# Patient Record
Sex: MALE | Race: WHITE | NOT HISPANIC OR LATINO | Employment: FULL TIME | URBAN - METROPOLITAN AREA
[De-identification: names, ages, dates, MRNs, and addresses within clinical notes are randomized per-mention and may not be internally consistent; named-entity substitution may affect disease eponyms.]

---

## 2020-07-18 PROCEDURE — U0003 INFECTIOUS AGENT DETECTION BY NUCLEIC ACID (DNA OR RNA); SEVERE ACUTE RESPIRATORY SYNDROME CORONAVIRUS 2 (SARS-COV-2) (CORONAVIRUS DISEASE [COVID-19]), AMPLIFIED PROBE TECHNIQUE, MAKING USE OF HIGH THROUGHPUT TECHNOLOGIES AS DESCRIBED BY CMS-2020-01-R: HCPCS | Performed by: FAMILY MEDICINE

## 2020-07-21 ENCOUNTER — TELEPHONE (OUTPATIENT)
Dept: URGENT CARE | Facility: CLINIC | Age: 40
End: 2020-07-21

## 2021-07-11 PROCEDURE — U0004 COV-19 TEST NON-CDC HGH THRU: HCPCS | Performed by: PERSONAL EMERGENCY RESPONSE ATTENDANT

## 2021-10-26 ENCOUNTER — LAB (OUTPATIENT)
Dept: LAB | Facility: HOSPITAL | Age: 41
End: 2021-10-26

## 2021-10-26 ENCOUNTER — OFFICE VISIT (OUTPATIENT)
Dept: INTERNAL MEDICINE | Facility: CLINIC | Age: 41
End: 2021-10-26

## 2021-10-26 ENCOUNTER — TELEPHONE (OUTPATIENT)
Dept: INTERNAL MEDICINE | Facility: CLINIC | Age: 41
End: 2021-10-26

## 2021-10-26 VITALS
HEIGHT: 68 IN | SYSTOLIC BLOOD PRESSURE: 118 MMHG | TEMPERATURE: 96.8 F | WEIGHT: 235.6 LBS | DIASTOLIC BLOOD PRESSURE: 80 MMHG | HEART RATE: 80 BPM | BODY MASS INDEX: 35.71 KG/M2 | RESPIRATION RATE: 20 BRPM | OXYGEN SATURATION: 97 %

## 2021-10-26 DIAGNOSIS — Z00.00 ROUTINE ADULT HEALTH MAINTENANCE: ICD-10-CM

## 2021-10-26 DIAGNOSIS — F41.0 PANIC ATTACKS: ICD-10-CM

## 2021-10-26 DIAGNOSIS — Z00.00 ROUTINE ADULT HEALTH MAINTENANCE: Primary | ICD-10-CM

## 2021-10-26 DIAGNOSIS — Z80.0 FAMILY HISTORY OF COLON CANCER: ICD-10-CM

## 2021-10-26 DIAGNOSIS — E66.09 CLASS 2 OBESITY DUE TO EXCESS CALORIES WITHOUT SERIOUS COMORBIDITY WITH BODY MASS INDEX (BMI) OF 36.0 TO 36.9 IN ADULT: ICD-10-CM

## 2021-10-26 DIAGNOSIS — Z23 NEED FOR VACCINATION: ICD-10-CM

## 2021-10-26 PROBLEM — E66.812 CLASS 2 OBESITY DUE TO EXCESS CALORIES WITHOUT SERIOUS COMORBIDITY WITH BODY MASS INDEX (BMI) OF 36.0 TO 36.9 IN ADULT: Status: ACTIVE | Noted: 2021-10-26

## 2021-10-26 LAB
ALBUMIN SERPL-MCNC: 4.8 G/DL (ref 3.5–5.2)
ALBUMIN/GLOB SERPL: 1.9 G/DL
ALP SERPL-CCNC: 106 U/L (ref 39–117)
ALT SERPL W P-5'-P-CCNC: 17 U/L (ref 1–41)
ANION GAP SERPL CALCULATED.3IONS-SCNC: 10.7 MMOL/L (ref 5–15)
AST SERPL-CCNC: 16 U/L (ref 1–40)
BASOPHILS # BLD AUTO: 0.04 10*3/MM3 (ref 0–0.2)
BASOPHILS NFR BLD AUTO: 0.5 % (ref 0–1.5)
BILIRUB SERPL-MCNC: 0.6 MG/DL (ref 0–1.2)
BUN SERPL-MCNC: 11 MG/DL (ref 6–20)
BUN/CREAT SERPL: 11 (ref 7–25)
CALCIUM SPEC-SCNC: 9.6 MG/DL (ref 8.6–10.5)
CHLORIDE SERPL-SCNC: 103 MMOL/L (ref 98–107)
CHOLEST SERPL-MCNC: 167 MG/DL (ref 0–200)
CO2 SERPL-SCNC: 26.3 MMOL/L (ref 22–29)
CREAT SERPL-MCNC: 1 MG/DL (ref 0.76–1.27)
DEPRECATED RDW RBC AUTO: 41.6 FL (ref 37–54)
EOSINOPHIL # BLD AUTO: 0.15 10*3/MM3 (ref 0–0.4)
EOSINOPHIL NFR BLD AUTO: 1.9 % (ref 0.3–6.2)
ERYTHROCYTE [DISTWIDTH] IN BLOOD BY AUTOMATED COUNT: 12.7 % (ref 12.3–15.4)
GFR SERPL CREATININE-BSD FRML MDRD: 83 ML/MIN/1.73
GLOBULIN UR ELPH-MCNC: 2.5 GM/DL
GLUCOSE SERPL-MCNC: 89 MG/DL (ref 65–99)
HCT VFR BLD AUTO: 44.5 % (ref 37.5–51)
HCV AB SER DONR QL: NORMAL
HDLC SERPL-MCNC: 41 MG/DL (ref 40–60)
HGB BLD-MCNC: 15.2 G/DL (ref 13–17.7)
IMM GRANULOCYTES # BLD AUTO: 0.01 10*3/MM3 (ref 0–0.05)
IMM GRANULOCYTES NFR BLD AUTO: 0.1 % (ref 0–0.5)
LDLC SERPL CALC-MCNC: 114 MG/DL (ref 0–100)
LDLC/HDLC SERPL: 2.78 {RATIO}
LYMPHOCYTES # BLD AUTO: 2.47 10*3/MM3 (ref 0.7–3.1)
LYMPHOCYTES NFR BLD AUTO: 31.7 % (ref 19.6–45.3)
MCH RBC QN AUTO: 30.1 PG (ref 26.6–33)
MCHC RBC AUTO-ENTMCNC: 34.2 G/DL (ref 31.5–35.7)
MCV RBC AUTO: 88.1 FL (ref 79–97)
MONOCYTES # BLD AUTO: 0.52 10*3/MM3 (ref 0.1–0.9)
MONOCYTES NFR BLD AUTO: 6.7 % (ref 5–12)
NEUTROPHILS NFR BLD AUTO: 4.6 10*3/MM3 (ref 1.7–7)
NEUTROPHILS NFR BLD AUTO: 59.1 % (ref 42.7–76)
NRBC BLD AUTO-RTO: 0 /100 WBC (ref 0–0.2)
PLATELET # BLD AUTO: 311 10*3/MM3 (ref 140–450)
PMV BLD AUTO: 9.8 FL (ref 6–12)
POTASSIUM SERPL-SCNC: 3.9 MMOL/L (ref 3.5–5.2)
PROT SERPL-MCNC: 7.3 G/DL (ref 6–8.5)
RBC # BLD AUTO: 5.05 10*6/MM3 (ref 4.14–5.8)
SODIUM SERPL-SCNC: 140 MMOL/L (ref 136–145)
TRIGL SERPL-MCNC: 60 MG/DL (ref 0–150)
TSH SERPL DL<=0.05 MIU/L-ACNC: 1.05 UIU/ML (ref 0.27–4.2)
VLDLC SERPL-MCNC: 12 MG/DL (ref 5–40)
WBC # BLD AUTO: 7.79 10*3/MM3 (ref 3.4–10.8)

## 2021-10-26 PROCEDURE — 80053 COMPREHEN METABOLIC PANEL: CPT | Performed by: STUDENT IN AN ORGANIZED HEALTH CARE EDUCATION/TRAINING PROGRAM

## 2021-10-26 PROCEDURE — 80061 LIPID PANEL: CPT | Performed by: STUDENT IN AN ORGANIZED HEALTH CARE EDUCATION/TRAINING PROGRAM

## 2021-10-26 PROCEDURE — 90471 IMMUNIZATION ADMIN: CPT | Performed by: STUDENT IN AN ORGANIZED HEALTH CARE EDUCATION/TRAINING PROGRAM

## 2021-10-26 PROCEDURE — 84443 ASSAY THYROID STIM HORMONE: CPT | Performed by: STUDENT IN AN ORGANIZED HEALTH CARE EDUCATION/TRAINING PROGRAM

## 2021-10-26 PROCEDURE — 90686 IIV4 VACC NO PRSV 0.5 ML IM: CPT | Performed by: STUDENT IN AN ORGANIZED HEALTH CARE EDUCATION/TRAINING PROGRAM

## 2021-10-26 PROCEDURE — 86803 HEPATITIS C AB TEST: CPT | Performed by: STUDENT IN AN ORGANIZED HEALTH CARE EDUCATION/TRAINING PROGRAM

## 2021-10-26 PROCEDURE — 36415 COLL VENOUS BLD VENIPUNCTURE: CPT

## 2021-10-26 PROCEDURE — 85025 COMPLETE CBC W/AUTO DIFF WBC: CPT | Performed by: STUDENT IN AN ORGANIZED HEALTH CARE EDUCATION/TRAINING PROGRAM

## 2021-10-26 PROCEDURE — 99385 PREV VISIT NEW AGE 18-39: CPT | Performed by: STUDENT IN AN ORGANIZED HEALTH CARE EDUCATION/TRAINING PROGRAM

## 2021-10-26 PROCEDURE — 99214 OFFICE O/P EST MOD 30 MIN: CPT | Performed by: STUDENT IN AN ORGANIZED HEALTH CARE EDUCATION/TRAINING PROGRAM

## 2021-10-26 NOTE — PROGRESS NOTES
Sherif Dickens is here today for an annual physical exam.     Feeling well today.  His main concern today is to set up screening for colonoscopy.  He states that his brother was recently diagnosed with colon cancer.  His brother is currently 36 years old and was diagnosed within the last year.  He has no other immediate family history of colon cancer.  He notes that he has regular bowel movements daily.  Of note, he does mention an episode of bloody stool which occurred about a month ago.  He denies changes in weight, fatigue, night sweats.        PHQ-2 Depression Screening  Little interest or pleasure in doing things? 0   Feeling down, depressed, or hopeless? 0   PHQ-2 Total Score 0      Of note patient reports 2 episodes of panic attacks within the last year.  These occurred around bedtime.  Episodes lasted about 20 minutes.  He describes these attacks as an impending sense of doom, shortness of breath, chest pain.  He notes of stress related to work around that time.  He has not had panic attacks recently.  No changes in mood.  Does not report anxiety currently.      I have reviewed the patient's medical, family, and social history in detail and updated the computerized patient record.    Screening history:  Colonoscopy - ordered today due to family history   Prostate - n/a  Metabolic - labs ordered today     Health Maintenance   Topic Date Due   • ANNUAL PHYSICAL  Never done   • TDAP/TD VACCINES (2 - Td or Tdap) 10/29/2018   • HEPATITIS C SCREENING  Never done   • COVID-19 Vaccine  Completed   • INFLUENZA VACCINE  Completed   • Pneumococcal Vaccine 0-64  Aged Out       Review of Systems   Constitutional: Negative for activity change, appetite change, chills, fatigue, fever and unexpected weight change.   Respiratory: Negative.    Cardiovascular: Negative.    Gastrointestinal: Positive for blood in stool (one episode ).   Genitourinary: Negative.    Musculoskeletal: Negative.    Skin: Negative.    Neurological:  "Negative.    Psychiatric/Behavioral: Negative.        /80   Pulse 80   Temp 96.8 °F (36 °C) (Temporal)   Resp 20   Ht 171.7 cm (67.6\")   Wt 107 kg (235 lb 9.6 oz)   SpO2 97%   BMI 36.25 kg/m²      Physical Exam    Vital signs reviewed.  General appearance: No acute distress  Eyes: conjunctiva clear without erythema; pupils equally round and reactive  ENT: external ears and nose normal; hearing normal, oropharynx clear  Neck: supple; no thyromegaly  CV: normal rate and rhythm; no peripheral edema  Respiratory: normal respiratory effort; lungs clear to auscultation bilaterally  MSK: normal gait and station; no focal joint deformity or swelling  Skin: no rash or wounds; normal turgor  Neuro: cranial nerves 2-12 grossly intact; normal sensation to light touch  Psych: mood and affect normal; recent and remote memory intact    No visits with results within 2 Week(s) from this visit.   Latest known visit with results is:   Admission on 07/11/2021, Discharged on 07/11/2021   Component Date Value Ref Range Status   • SARS-CoV-2 IVONNE 07/11/2021 Not Detected  Not Detected Final   • Rapid Influenza A Ag 07/11/2021 Negative  Negative Final   • Rapid Influenza B Ag 07/11/2021 Negative  Negative Final   • Internal Control 07/11/2021 Passed  Passed Final   • Lot Number 07/11/2021 [id number]   Final   • Expiration Date 07/11/2021 [id number]   Final          Current Outpatient Medications:   •  multivitamin with minerals (MULTIVITAMIN ADULT PO), multivitamin, Disp: , Rfl:   •  predniSONE (DELTASONE) 10 MG (21) dose pack, Daily taper- 6/5/4/3/2/1, Disp: 21 tablet, Rfl: 0    Diagnoses and all orders for this visit:    1. Routine adult health maintenance (Primary)  -     CBC Auto Differential; Future  -     Comprehensive Metabolic Panel; Future  -     TSH Rfx On Abnormal To Free T4; Future  -     Lipid Panel; Future  -     Hepatitis C Antibody; Future    2. Need for vaccination  -     FluLaval/Fluarix/Fluzone >6 Months " ([id number])    3. Family history of colon cancer  Comments:  Brother recently diagnosed with colon cancer at age 36  Orders:  -     Ambulatory Referral For Screening Colonoscopy    4. Class 2 obesity due to excess calories without serious comorbidity with body mass index (BMI) of 36.0 to 36.9 in adult  Assessment & Plan:  Diet/exercise changes would be the best way to treat this at this time. Work on weight loss through a healthy diet with increased fiber from fruits and vegetables and decreased carbohydrates (sweets/sugars, pop, excess pasta/bread).  Should increase water intake and exercise a few times a week.  Discussed with patient that if these methods are unsuccessful a multidisciplinary team may be beneficial.  Discussed with patient long-term sequela associated with with obesity including but not limited to hypertension diabetes hyperlipidemia cardiovascular disease and stroke.        5. Panic attacks  Comments:  2 discrete episodes within the last year in the context of stressors from work.  Coping mechanisms discussed.  Continue to monitor    Counseling was given to patient for the following topics:  appropriate exercise, healthy eating habits, disease prevention, risk factors for cancer, importance of self testicular exam, importance of immunizations, including risks and benefits, bone health, sun safety, seatbelt use, safe driving and safe sex. Also discussed the importance of regular dental and vision care, as well recommendation for a yearly screening skin exam after age 40.  Written information provided to patient on these topics and other health maintenance issues.    Follow up yearly for physical    Adrian Branch MD  10/26/21

## 2021-10-26 NOTE — ASSESSMENT & PLAN NOTE
Diet/exercise changes would be the best way to treat this at this time. Work on weight loss through a healthy diet with increased fiber from fruits and vegetables and decreased carbohydrates (sweets/sugars, pop, excess pasta/bread).  Should increase water intake and exercise a few times a week.  Discussed with patient that if these methods are unsuccessful a multidisciplinary team may be beneficial.  Discussed with patient long-term sequela associated with with obesity including but not limited to hypertension diabetes hyperlipidemia cardiovascular disease and stroke.

## 2021-10-26 NOTE — TELEPHONE ENCOUNTER
"Called pt and informed to check mychart and lft cb number     Adrian Branch MD  [facility]  [address] [facility]  I have reviewed your blood results and overall, everything looks great. Your LDL or \"bad\" cholesterol is slightly elevated. I recommend eating a diet low in red meat, foods high in fat as well as starting regular physical activity.     "

## 2021-10-26 NOTE — TELEPHONE ENCOUNTER
"----- Message from Adrian Branch MD sent at 10/26/2021  3:31 PM EDT -----  I have reviewed your blood results and overall, everything looks great. Your LDL or \"bad\" cholesterol is slightly elevated. I recommend eating a diet low in red meat, foods high in fat as well as starting regular physical activity.   "

## 2021-12-22 RX ORDER — SOD SULF/POT CHLORIDE/MAG SULF 1.479 G
12 TABLET ORAL TAKE AS DIRECTED
Qty: 24 TABLET | Refills: 0 | Status: SHIPPED | OUTPATIENT
Start: 2021-12-22 | End: 2022-10-28

## 2022-02-11 RX ORDER — SOD SULF/POT CHLORIDE/MAG SULF 1.479 G
1 TABLET ORAL ONCE
Qty: 24 TABLET | Refills: 0 | Status: SHIPPED | OUTPATIENT
Start: 2022-02-11 | End: 2022-02-11

## 2022-02-24 ENCOUNTER — OUTSIDE FACILITY SERVICE (OUTPATIENT)
Dept: GASTROENTEROLOGY | Facility: CLINIC | Age: 42
End: 2022-02-24

## 2022-02-24 DIAGNOSIS — Z80.0 FAMILY HISTORY OF MALIGNANT NEOPLASM OF COLON: Primary | ICD-10-CM

## 2022-02-24 PROCEDURE — 45378 DIAGNOSTIC COLONOSCOPY: CPT | Performed by: INTERNAL MEDICINE

## 2022-08-17 ENCOUNTER — CLINICAL SUPPORT (OUTPATIENT)
Dept: GENETICS | Facility: HOSPITAL | Age: 42
End: 2022-08-17

## 2022-08-17 ENCOUNTER — LAB (OUTPATIENT)
Dept: LAB | Facility: HOSPITAL | Age: 42
End: 2022-08-17

## 2022-08-17 DIAGNOSIS — Z80.0 FAMILY HISTORY OF COLON CANCER: ICD-10-CM

## 2022-08-17 DIAGNOSIS — Z13.79 GENETIC TESTING: Primary | ICD-10-CM

## 2022-08-17 DIAGNOSIS — Z80.0 FAMILY HISTORY OF PANCREATIC CANCER: ICD-10-CM

## 2022-08-17 DIAGNOSIS — Z80.8 FAMILY HISTORY OF BONE CANCER: ICD-10-CM

## 2022-08-17 DIAGNOSIS — Z84.89 FAMILY HISTORY OF BRAIN TUMOR: ICD-10-CM

## 2022-08-17 PROCEDURE — 96040: CPT | Performed by: GENETIC COUNSELOR, MS

## 2022-08-17 NOTE — PROGRESS NOTES
Sherif Dickens is a 41-year-old male who was referred for genetic counseling due to a family history of cancer. Mr. Dickens does not have a personal cancer history. He had his first colonoscopy this year and polyps were not identified. He was interested in discussing his risk for a hereditary cancer syndrome given his family history. Mr. Dickens was interested in pursuing a multi-gene panel, specifically the CancerNext-Expanded panel was ordered through [facility].      FAMILY HISTORY (see attached pedigree):    Brother:    Colon cancer, 45 (metastatic to kidney and had partial nephrectomy)       Bone cancer (sarcoma), 6   Father:     Pancreatic cancer, 51  Pat. Grandfather:   Bone cancer, late 60s  Mat. Uncle:    Brain tumor, 60  Mat. 1st cousin once removed: Brain tumor, diagnosed before age 1    We do not have medical records regarding the diagnoses in Mr. Dickens’s family.    RISK ASSESSMENT: Mr. Dickens’s family history led to concern regarding a hereditary cancer syndrome. He clearly meets NCCN guidelines criteria for BRCA1/2 testing based on his paternal family history of pancreatic cancer, and he meets criteria for Ramachandran syndrome testing given the family history of early onset colorectal cancer. In cases where an affected relative is not available for testing or not willing to pursue testing, it is appropriate to offer testing to an unaffected individual. Mr. Dickens opted to pursue multigene panel testing via the CancerNext-Expanded panel. These risk assessments are based on the family history information provided at the time of the appointment and could change in the future should new information be obtained.    GENETIC COUNSELING (30 minutes):  We reviewed the family history information in detail. Cases of cancer follow three general patterns: sporadic, familial, and hereditary.  While most cancer is sporadic, some cases appear to occur in family clusters.  These cases are said to be familial and account for  10-20% of cancer cases.  Familial cases may be due to a combination of shared genes and environmental factors among family members.  In even fewer families, the cancer is said to be inherited, and the genes responsible for the cancer are known.      Family histories typical of hereditary cancer syndromes usually include multiple first- and second-degree relatives diagnosed with cancer types that define a syndrome. These cases tend to be diagnosed at younger-than-expected ages and can be bilateral or multifocal. The cancer in these families follows an autosomal dominant inheritance pattern, which indicates the likely presence of a mutation in a cancer susceptibility gene. Children and siblings of an individual believed to carry this mutation have a 50% chance of inheriting that mutation, thereby inheriting the increased risk to develop cancer. These mutations can be passed down from the maternal or the paternal lineage.    Hereditary pancreatic cancer accounts for approximately 10% of all cases of pancreatic cancer.   The gene most often associated with a family history of pancreatic cancer is BRCA2. Mutations in BRCA2 confer up to a 7% risk for pancreatic cancer. Mutations in BRCA1 and BRCA2 also confer an increased risk for breast cancer, ovarian cancer, male breast cancer, and prostate cancer. Women with a BRCA1 or BRCA2 mutation have up to an 87% risk of breast cancer and up to a 44% risk of ovarian cancer. We also discussed Ramachandran syndrome, which is the most common cause of hereditary colorectal cancer. Ramachandran syndrome is caused by mutations in mismatch repair genes, (MLH1, MSH2, MSH6, PMS2, and EPCAM).  The lifetime risk for colon cancer for individuals with Ramachandran syndrome is approximately 80% if there is no intervention (i.e. removal of polyps detected on colonoscopy).  Other risks include gastric, urinary tract, small intestines, biliary tract, pancreatic, and brain.  Women with Ramachandran syndrome also have an  elevated risk for ovarian and endometrial cancer.  We also discussed Li-Fraumeni syndrome given his brother’s history of multiple primary cancers, including a sarcoma diagnosed at a young age.     The standard approach to genetic testing is via a multigene panel.  Genes included on these panels have varying degrees of risk associated, and management and screening guidelines vary based on the specific gene.  Hereditary cancer syndromes can demonstrate incomplete penetrance and variable expression within families. There are genes that are evaluated that have been more recently described, and there may be less data regarding the risks and therefore may not have established management guidelines at this time. Based on Mr. Dickens’s family history and his desire to get more information regarding his personal risks he opted to pursue testing through a panel evaluating several other genes known to increase the risk for cancer.    GENETIC TESTING:  The risks, benefits and limitations of genetic testing and implications for clinical management following testing were reviewed. DNA test results can influence decisions regarding screening and prevention.  Genetic testing can have significant psychological implications for both individuals and families. Also discussed was the possibility of employment and insurance discrimination based on genetic test results and the federal and states laws that are in place to prevent this (JOHANNY).         We discussed panel testing, which would involve testing 77 genes associated with increased cancer risk. The benefits and limitations of genetic testing were discussed. The implications of a positive or negative test result were discussed. We also discussed the importance of testing on an affected relative. We discussed the possibility that, in some cases, genetic test results may be ambiguous due to the identification of a genetic variant. These variants may or may not be associated with an  increased cancer risk. With multigene panel testing, it is not uncommon for a variant of uncertain significance (VUS) to be identified.  If a VUS is identified, testing family members is not recommended and screening recommendations are made based on the family history. The laboratories that perform genetic testing work to reclassify the VUS and send out an amended report if and when a VUS is reclassified.  The majority of variant findings are ultimately reclassified to a negative result. Given his family history, a negative test result does not eliminate all cancer risk, although the risk would not be as high as it would with positive genetic testing.     PLAN:  Genetic testing via the CancerNext-Expanded panel through [facility] was ordered and results are expected in 2-3 weeks. We will contact Mr. Dickens with his results once they are received. Mr. Dickens is encouraged to contact us in the meantime with any questions he may have at [phone].       Mary Moon MS, [facility], [facility]  Licensed Certified Genetic Counselor

## 2022-08-31 ENCOUNTER — DOCUMENTATION (OUTPATIENT)
Dept: GENETICS | Facility: HOSPITAL | Age: 42
End: 2022-08-31

## 2022-10-28 ENCOUNTER — LAB (OUTPATIENT)
Dept: LAB | Facility: HOSPITAL | Age: 42
End: 2022-10-28

## 2022-10-28 ENCOUNTER — OFFICE VISIT (OUTPATIENT)
Dept: INTERNAL MEDICINE | Facility: CLINIC | Age: 42
End: 2022-10-28

## 2022-10-28 VITALS
SYSTOLIC BLOOD PRESSURE: 110 MMHG | WEIGHT: 222.8 LBS | RESPIRATION RATE: 18 BRPM | DIASTOLIC BLOOD PRESSURE: 84 MMHG | BODY MASS INDEX: 34.28 KG/M2 | OXYGEN SATURATION: 96 % | TEMPERATURE: 97.3 F | HEART RATE: 76 BPM

## 2022-10-28 DIAGNOSIS — Z23 NEED FOR INFLUENZA VACCINATION: ICD-10-CM

## 2022-10-28 DIAGNOSIS — Z00.00 ANNUAL PHYSICAL EXAM: Primary | ICD-10-CM

## 2022-10-28 DIAGNOSIS — Z00.00 ANNUAL PHYSICAL EXAM: ICD-10-CM

## 2022-10-28 DIAGNOSIS — Z23 NEED FOR COVID-19 VACCINE: ICD-10-CM

## 2022-10-28 PROCEDURE — 80061 LIPID PANEL: CPT | Performed by: NURSE PRACTITIONER

## 2022-10-28 PROCEDURE — 85027 COMPLETE CBC AUTOMATED: CPT | Performed by: NURSE PRACTITIONER

## 2022-10-28 PROCEDURE — 90686 IIV4 VACC NO PRSV 0.5 ML IM: CPT | Performed by: NURSE PRACTITIONER

## 2022-10-28 PROCEDURE — 0124A PR ADM SARSCOV2 30MCG/0.3ML BST: CPT | Performed by: NURSE PRACTITIONER

## 2022-10-28 PROCEDURE — 90471 IMMUNIZATION ADMIN: CPT | Performed by: NURSE PRACTITIONER

## 2022-10-28 PROCEDURE — 80053 COMPREHEN METABOLIC PANEL: CPT | Performed by: NURSE PRACTITIONER

## 2022-10-28 PROCEDURE — 91312 COVID-19 (PFIZER) BIVALENT BOOSTER 12+YRS: CPT | Performed by: NURSE PRACTITIONER

## 2022-10-28 PROCEDURE — 99396 PREV VISIT EST AGE 40-64: CPT | Performed by: NURSE PRACTITIONER

## 2022-10-28 NOTE — PROGRESS NOTES
Patient Care Team:  Adilia Dickens APRN as PCP - General (Nurse Practitioner)  Bassam Trinidad MD as Consulting Physician (Gastroenterology)     Chief complaint: Patient is in today for a physical          Patient is a 42 y.o. male who presents for his yearly physical exam.     MARIELENA Tam is a 42-year-old male who is establishing care with me today.  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         Health maintenance/lifestyle:  Health Maintenance   Topic Date Due   • TDAP/TD VACCINES (2 - Td or Tdap) 10/29/2018   • ANNUAL PHYSICAL  10/27/2022   • COLORECTAL CANCER SCREENING  02/25/2027   • HEPATITIS C SCREENING  Completed   • COVID-19 Vaccine  Completed   • INFLUENZA VACCINE  Completed   • Pneumococcal Vaccine 0-64  Aged Out       Immunization History   Administered Date(s) Administered   • COVID-19 (PFIZER) BIVALENT BOOSTER 12+YRS 10/28/2022   • COVID-19 (PFIZER) PURPLE CAP 03/09/2021, 04/01/2021, 12/24/2021   • FluLaval/Fluzone >6mos 10/26/2021, 10/28/2022   • Flublok 18+yrs 09/30/2021   • Influenza Quad Vaccine (Inpatient) 11/23/2016   • Tdap 10/29/2008        Covid vaccine: Due for booster-counseled-we will update today  Influenza: Due-we will update today  Tetanus: due - can return for a nurse visit for Td in 2 weeks.   Hep C screening: Negative in 2021.  Denies high behaviors.    Cancer-related family history includes Bone cancer in his brother; Colon cancer (age of onset: 45) in his brother; Lung cancer in his maternal uncle; Pancreatic cancer in his father. There is no history of Prostate cancer or Breast cancer.  He has already had a colonoscopy in 2/2022 with Dr. Trinidad at who recommends repeat screening in 5 years.  He was also evaluated by genetics who found no concerning findings     Last Completed Colonoscopy          COLORECTAL CANCER SCREENING (COLONOSCOPY - Every 5 Years) Next due on 2/25/2027     02/25/2022  SCANNED - COLONOSCOPY                 reports being sexually active and has had partner(s) who are female. He reports using the following method of birth control/protection: Other.  STI concerns: denies      Eye exam: Due- advised    Dental exam: UTD  Sleep:  Ok    Sunscreen use: not regularly.     Diet: fairly healthy.   Caffeine: 1 c coffee a day   Multivitamin: not every day- encouraged to take consistently   Exercise: couple times a week- treadmill, elliptical.      Wt Readings from Last 3 Encounters:   10/28/22 101 kg (222 lb 12.8 oz)   10/26/21 107 kg (235 lb 9.6 oz)   07/11/21 109 kg (240 lb)         Social History     Tobacco Use   Smoking Status Never   Smokeless Tobacco Never     Social History     Substance and Sexual Activity   Alcohol Use Yes   • Alcohol/week: 4.0 standard drinks   • Types: 4 Cans of beer per week       PHQ-2 Depression Screening  Little interest or pleasure in doing things? 0-->not at all   Feeling down, depressed, or hopeless? 0-->not at all   PHQ-2 Total Score 0         Review of Systems   Constitutional: Negative for activity change, appetite change, chills, diaphoresis, fatigue, fever, unexpected weight gain and unexpected weight loss.   HENT: Negative for voice change.    Eyes: Negative for blurred vision, double vision, pain and visual disturbance.   Respiratory: Negative for cough, chest tightness, shortness of breath and wheezing.    Cardiovascular: Negative for chest pain, palpitations and leg swelling.   Gastrointestinal: Negative for abdominal distention, abdominal pain, constipation, diarrhea, nausea and vomiting.   Endocrine: Negative for cold intolerance, heat intolerance, polydipsia, polyphagia and polyuria.   Genitourinary: Negative for difficulty urinating, frequency and urgency.   Musculoskeletal: Negative for arthralgias and myalgias.   Skin: Negative for color change, dry skin and rash.   Neurological: Negative for dizziness, facial asymmetry,  weakness, light-headedness, numbness, headache and confusion.   Hematological: Negative for adenopathy. Does not bruise/bleed easily.   Psychiatric/Behavioral: Negative for decreased concentration and sleep disturbance. The patient is not nervous/anxious.          History  Social History     Socioeconomic History   • Marital status:    Tobacco Use   • Smoking status: Never   • Smokeless tobacco: Never   Vaping Use   • Vaping Use: Never used   Substance and Sexual Activity   • Alcohol use: Yes     Alcohol/week: 4.0 standard drinks     Types: 4 Cans of beer per week   • Drug use: Never   • Sexual activity: Yes     Partners: Female     Birth control/protection: Other     History reviewed. No pertinent past medical history.   Past Surgical History:   Procedure Laterality Date   • KNEE SURGERY     • SPINAL FUSION  2010    c6-7   • SPINAL FUSION  2014    c6-7      No Known Allergies   Family History   Problem Relation Age of Onset   • Hypertension Mother    • Pancreatic cancer Father    • Bone cancer Brother    • Colon cancer Brother 45   • Lung cancer Maternal Uncle    • Prostate cancer Neg Hx    • Breast cancer Neg Hx        Current Outpatient Medications:   •  multivitamin with minerals tablet tablet, multivitamin, Disp: , Rfl:                   /84   Pulse 76   Temp 97.3 °F (36.3 °C)   Resp 18   Wt 101 kg (222 lb 12.8 oz)   SpO2 96%   BMI 34.28 kg/m²       Physical Exam  Vitals and nursing note reviewed.   Constitutional:       General: He is not in acute distress.     Appearance: Normal appearance. He is well-developed. He is not diaphoretic.   HENT:      Head: Normocephalic and atraumatic.      Right Ear: External ear normal.      Left Ear: External ear normal.      Nose: Nose normal.   Eyes:      General: No scleral icterus.        Right eye: No discharge.         Left eye: No discharge.      Conjunctiva/sclera: Conjunctivae normal.      Pupils: Pupils are equal, round, and reactive to light.    Neck:      Thyroid: No thyromegaly.      Vascular: No JVD (no bruits).      Trachea: No tracheal deviation.   Cardiovascular:      Rate and Rhythm: Normal rate and regular rhythm.      Heart sounds: Normal heart sounds. No murmur heard.    No friction rub. No gallop.   Pulmonary:      Effort: Pulmonary effort is normal. No respiratory distress.      Breath sounds: Normal breath sounds. No wheezing or rales.   Chest:      Chest wall: No tenderness.   Abdominal:      General: Bowel sounds are normal. There is no distension.      Palpations: Abdomen is soft. There is no mass.      Tenderness: There is no abdominal tenderness. There is no guarding or rebound.      Hernia: No hernia is present.   Genitourinary:     Comments: deferred  Musculoskeletal:         General: No tenderness or deformity. Normal range of motion.      Cervical back: Normal range of motion and neck supple.   Lymphadenopathy:      Cervical: No cervical adenopathy.   Skin:     General: Skin is warm and dry.      Coloration: Skin is not pale.      Findings: No erythema or rash.   Neurological:      Mental Status: He is alert and oriented to person, place, and time.      Motor: No abnormal muscle tone.      Deep Tendon Reflexes: Reflexes are normal and symmetric. Reflexes normal.   Psychiatric:         Speech: Speech normal.         Behavior: Behavior normal.         Thought Content: Thought content normal.         Judgment: Judgment normal.                   Diagnoses and all orders for this visit:    1. Annual physical exam (Primary)  -     Comprehensive Metabolic Panel; Future  -     Lipid Panel; Future  -     CBC (No Diff); Future    2. Need for COVID-19 vaccine  -     COVID-19 Bivalent Booster (Pfizer) 12+yrs    3. Need for influenza vaccination  -     FluLaval/Fluzone >6 mos ([id number])         Labs  ordered as above- will notify of results and treat accordingly. If patient has not received results within one week via mychart or letter, they  will notify my office  Immunizations and screenings:  preventative/screenings are up-to-date/addressed as noted in the HPI.  Counseling: The patient is advised to follow a low fat, low cholesterol diet, attempt to lose weight and return for routine annual checkups  Health Maintenance reviewed .    Follow up: Return for Annual.  Plan of care discussed with pt. They verbalized understanding and agreement.     Electronically signed by : JELENA Loera            Dictated Utilizing Dragon Dictation   Please note that portions of this note were completed with a voice recognition program.   Part of this note may be an electronic transcription/translation of spoken language to printed text using the Dragon Dictation System.

## 2022-10-29 LAB
ALBUMIN SERPL-MCNC: 5.1 G/DL (ref 3.5–5.2)
ALBUMIN/GLOB SERPL: 2.6 G/DL
ALP SERPL-CCNC: 88 U/L (ref 39–117)
ALT SERPL W P-5'-P-CCNC: 13 U/L (ref 1–41)
ANION GAP SERPL CALCULATED.3IONS-SCNC: 14.6 MMOL/L (ref 5–15)
AST SERPL-CCNC: 16 U/L (ref 1–40)
BILIRUB SERPL-MCNC: 0.8 MG/DL (ref 0–1.2)
BUN SERPL-MCNC: 10 MG/DL (ref 6–20)
BUN/CREAT SERPL: 9.5 (ref 7–25)
CALCIUM SPEC-SCNC: 10 MG/DL (ref 8.6–10.5)
CHLORIDE SERPL-SCNC: 102 MMOL/L (ref 98–107)
CHOLEST SERPL-MCNC: 185 MG/DL (ref 0–200)
CO2 SERPL-SCNC: 24.4 MMOL/L (ref 22–29)
CREAT SERPL-MCNC: 1.05 MG/DL (ref 0.76–1.27)
DEPRECATED RDW RBC AUTO: 42.1 FL (ref 37–54)
EGFRCR SERPLBLD CKD-EPI 2021: 92 ML/MIN/1.73
ERYTHROCYTE [DISTWIDTH] IN BLOOD BY AUTOMATED COUNT: 12.9 % (ref 12.3–15.4)
GLOBULIN UR ELPH-MCNC: 2 GM/DL
GLUCOSE SERPL-MCNC: 85 MG/DL (ref 65–99)
HCT VFR BLD AUTO: 45.7 % (ref 37.5–51)
HDLC SERPL-MCNC: 46 MG/DL (ref 40–60)
HGB BLD-MCNC: 15.9 G/DL (ref 13–17.7)
LDLC SERPL CALC-MCNC: 130 MG/DL (ref 0–100)
LDLC/HDLC SERPL: 2.83 {RATIO}
MCH RBC QN AUTO: 30.9 PG (ref 26.6–33)
MCHC RBC AUTO-ENTMCNC: 34.8 G/DL (ref 31.5–35.7)
MCV RBC AUTO: 88.7 FL (ref 79–97)
PLATELET # BLD AUTO: 299 10*3/MM3 (ref 140–450)
PMV BLD AUTO: 9.3 FL (ref 6–12)
POTASSIUM SERPL-SCNC: 4 MMOL/L (ref 3.5–5.2)
PROT SERPL-MCNC: 7.1 G/DL (ref 6–8.5)
RBC # BLD AUTO: 5.15 10*6/MM3 (ref 4.14–5.8)
SODIUM SERPL-SCNC: 141 MMOL/L (ref 136–145)
TRIGL SERPL-MCNC: 45 MG/DL (ref 0–150)
VLDLC SERPL-MCNC: 9 MG/DL (ref 5–40)
WBC NRBC COR # BLD: 6.2 10*3/MM3 (ref 3.4–10.8)

## 2022-10-31 ENCOUNTER — TELEPHONE (OUTPATIENT)
Dept: INTERNAL MEDICINE | Facility: CLINIC | Age: 42
End: 2022-10-31

## 2022-11-01 ENCOUNTER — TELEPHONE (OUTPATIENT)
Dept: INTERNAL MEDICINE | Facility: CLINIC | Age: 42
End: 2022-11-01

## 2022-11-01 DIAGNOSIS — E78.2 MIXED HYPERLIPIDEMIA: Primary | ICD-10-CM

## 2022-11-01 RX ORDER — ATORVASTATIN CALCIUM 10 MG/1
10 TABLET, FILM COATED ORAL DAILY
Qty: 30 TABLET | Refills: 6 | Status: SHIPPED | OUTPATIENT
Start: 2022-11-01

## 2022-11-01 NOTE — TELEPHONE ENCOUNTER
----- Message from JELENA Ellison sent at 10/31/2022 11:00 AM EDT -----  Please let him know that his labs show that his cholesterol is too high with LDL at 130.  This is worse than it was a year ago.  I would recommend going ahead and starting on cholesterol medications if he is okay with this.  Let me know and I can send in if needed.  If we start cholesterol medications, I would also like him to have his liver functions tests evaluated 1 month after starting and follow-up 6 months after starting to recheck cholesterol.  Patient should consume lean meats, whole grains, vegetables, and fruits, while avoiding concentrated sweets, junk foods, and fast foods.  Patient should engage in a minimum of 150 minutes of moderate intensity exercise per week as well.

## 2022-11-01 NOTE — TELEPHONE ENCOUNTER
Atorvastatin sent in. [facility] has been ordered.  He needs to stop into the lab in 1 month to have this collected.  Please schedule 6-month follow-up to reevaluate cholesterol.

## 2022-11-01 NOTE — TELEPHONE ENCOUNTER
LVM on personal vm that rx was sent and lab was ordered for him to stop in a month to get that done, also schedule for a 6 month f/u as well.

## 2023-01-20 ENCOUNTER — TELEMEDICINE (OUTPATIENT)
Dept: INTERNAL MEDICINE | Facility: CLINIC | Age: 43
End: 2023-01-20
Payer: COMMERCIAL

## 2023-01-20 DIAGNOSIS — J02.9 SORE THROAT: ICD-10-CM

## 2023-01-20 DIAGNOSIS — R05.1 ACUTE COUGH: Primary | ICD-10-CM

## 2023-01-20 LAB
EXPIRATION DATE: NORMAL
EXPIRATION DATE: NORMAL
FLUAV AG UPPER RESP QL IA.RAPID: NOT DETECTED
FLUBV AG UPPER RESP QL IA.RAPID: NOT DETECTED
INTERNAL CONTROL: NORMAL
INTERNAL CONTROL: NORMAL
Lab: NORMAL
Lab: NORMAL
S PYO AG THROAT QL: NEGATIVE
SARS-COV-2 RNA RESP QL NAA+PROBE: NOT DETECTED

## 2023-01-20 PROCEDURE — 87880 STREP A ASSAY W/OPTIC: CPT | Performed by: NURSE PRACTITIONER

## 2023-01-20 PROCEDURE — 87636 SARSCOV2 & INF A&B AMP PRB: CPT | Performed by: NURSE PRACTITIONER

## 2023-01-20 PROCEDURE — 99213 OFFICE O/P EST LOW 20 MIN: CPT | Performed by: NURSE PRACTITIONER

## 2023-01-20 RX ORDER — AMOXICILLIN 500 MG/1
500 CAPSULE ORAL 2 TIMES DAILY
Qty: 20 CAPSULE | Refills: 0 | Status: SHIPPED | OUTPATIENT
Start: 2023-01-20 | End: 2023-01-30

## 2023-01-20 NOTE — PROGRESS NOTES
This was an audio and video enabled visit.   This provider is located at the [facility] (through [facility] [place] [facility]), [address], [place] [address] using a secure SimpleGeohart Video Visit through "[employer] or [employer] (pt preference). Sherif  is being seen remotely via telehealth  in [place]. Pt provided a secure environment for this session.  Sherif may be asked to present for in office testing and/or evaluation if felt to be unsafe for telemedicine.    The provider identified herself /credentials as appropriate.   By proceeding with the telehealth visit, the patient consents to be seen remotely which allows for patient identifiable information to be sent to a third party as needed. The patient may refuse to be seen remotely at any time. The electronic data is encrypted and password protected, and the patient is advised of the potential risks to privacy not withstanding such measures.    You have chosen to receive care through a telehealth visit. Do you consent to use a video/audio connection for your medical care today? Yes      Subjective   Sherif Dickens is a 42 y.o. male.     Chief Complaint   Patient presents with   • Cough   • Sore Throat       Sore Throat   This is a new problem. The current episode started today. The problem has been unchanged. Neither side of throat is experiencing more pain than the other. There has been no fever. Associated symptoms include congestion and coughing. Pertinent negatives include no abdominal pain, diarrhea, drooling, ear discharge, ear pain, headaches, hoarse voice, neck pain, shortness of breath, stridor, swollen glands, trouble swallowing or vomiting. He has had exposure to strep. He has tried nothing for the symptoms. The treatment provided no relief.        Cough since yesterday   sore throat started today denies    His wife and daughter have strep.       The following portions of the patient's history were reviewed and  updated as appropriate: allergies, current medications, past family history, past medical history, past social history, past surgical history and problem list.        Review of Systems   HENT: Positive for congestion and sore throat. Negative for drooling, ear discharge, ear pain, hoarse voice, swollen glands and trouble swallowing.    Respiratory: Positive for cough. Negative for shortness of breath and stridor.    Gastrointestinal: Negative for abdominal pain, diarrhea and vomiting.   Musculoskeletal: Negative for neck pain.           No outpatient medications have been marked as taking for the 1/20/23 encounter (Telemedicine) with Adilia Dickens APRN.     No Known Allergies        Objective   Physical Exam   Constitutional: He is oriented to person, place, and time. He appears well-developed and well-nourished. No distress.   HENT:   Head: Normocephalic and atraumatic.   Right Ear: External ear normal.   Left Ear: External ear normal.   Mouth/Throat: Oropharynx is clear and moist.   Eyes: Right eye exhibits no discharge. Left eye exhibits no discharge. No scleral icterus.   Neck: Neck normal appearance.  Pulmonary/Chest: Effort normal. No accessory muscle usage.  No respiratory distress.No use of oxygen by nasal cannula  Abdominal: Abdomen appears normal.   Neurological: He is alert and oriented to person, place, and time.   Skin: Skin is dry. He is not diaphoretic. No erythema. No pallor.   Psychiatric: He has a normal mood and affect. His speech is normal and behavior is normal. Judgment normal. He is attentive.         There were no vitals filed for this visit.  There is no height or weight on file to calculate BMI.        Assessment & Plan   Diagnoses and all orders for this visit:    1. Acute cough (Primary)  -     Covid-19 + Flu A&B AG, Veritor  -     amoxicillin (AMOXIL) 500 MG capsule; Take 1 capsule by mouth 2 (Two) Times a Day for 10 days.  Dispense: 20 capsule; Refill: 0    2. Sore throat  -      POCT rapid strep A  -     amoxicillin (AMOXIL) 500 MG capsule; Take 1 capsule by mouth 2 (Two) Times a Day for 10 days.  Dispense: 20 capsule; Refill: 0    Patient presented to the office for testing.  He was negative for strep, COVID, and flu.  However, I am little concerned that he could have a false negative on his strep test.  I will go ahead and send in amoxicillin for him to use if symptoms worsen over the weekend.  He should not start this immediately and only start if symptoms worsen.  Encouraged him to try warm salt water gargles, increase fluids for now.  Can call for further direction on Monday if symptomatic and has not started antibiotic.         Return if symptoms worsen or fail to improve.    I have reviewed the limitations of a telehealth visit (such as lack of a physical exam and unable to obtain vital signs) and advised the patient that they may need to follow up for an office visit should their symptoms or concerns persist, worsen, or change.  Patient was encouraged to keep me informed of any acute changes, lack of improvement, or any new concerning symptoms.   I discussed my findings,recommendations, and plan of care was with the patient. They verbalized understanding and agreement.    Dictated Utilizing Dragon Dictation   Please note that portions of this note were completed with a voice recognition program.   Part of this note may be an electronic transcription/translation of spoken language to printed text using the Dragon Dictation System.

## 2023-03-02 ENCOUNTER — DOCUMENTATION (OUTPATIENT)
Dept: GENETICS | Facility: HOSPITAL | Age: 43
End: 2023-03-02
Payer: COMMERCIAL

## 2023-03-02 ENCOUNTER — TELEPHONE (OUTPATIENT)
Dept: GENETICS | Facility: HOSPITAL | Age: 43
End: 2023-03-02
Payer: COMMERCIAL

## 2023-03-02 NOTE — PROGRESS NOTES
Sherif Dickens, a 42-year-old male, was originally seen in August 2022  for genetic counseling due to a family history of cancer. 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  Enough evidence has been obtained to reclassify the previously identified “variant of uncertain significance” to a “likely benign” variant, meaning that this is now considered a negative result for that gene. The reclassification of this VUS was discussed with Mr. Dickens by telephone on 3/2/2023.  Of note, the VUS in the DICER1 gene that was also identified on Mr. Dickens's testing is still classified as a VUS.    PLAN: Genetic counseling services remain available to Mr. Dickens and he is welcome to contact us with any questions or concerns at [phone].       Mary Moon MS, [facility], [facility]  Licensed Certified Genetic Counselor      Cc: Sherif Maninder Trinidad MD

## 2023-03-02 NOTE — TELEPHONE ENCOUNTER
At the request of the genetic counselor, I spoke with the patient to disclose a gene reclassication from 2022 genetic testing.

## 2023-05-19 ENCOUNTER — TELEPHONE (OUTPATIENT)
Dept: INTERNAL MEDICINE | Facility: CLINIC | Age: 43
End: 2023-05-19

## 2023-05-19 DIAGNOSIS — E78.2 MIXED HYPERLIPIDEMIA: Primary | ICD-10-CM

## 2023-05-19 NOTE — TELEPHONE ENCOUNTER
Spoke with pt and he states he was supposed to have cholesterol rechecked after 6 months. Please advise if pt needs apt.

## 2023-05-19 NOTE — TELEPHONE ENCOUNTER
"    Caller: Sherif Dickens \"Yonatan\"    Relationship: Self    Best call back number: [phone]    What orders are you requesting (i.e. lab or imaging): LABS, CHOLESTEROL, CHEMISTRY PANEL    In what timeframe would the patient need to come in: SOON    Where will you receive your lab/imaging services: HERE    Additional notes: PATIENT WAS TOLD HE NEEDS TO GET LABS DONE, IF HE NEEDS AN APPOINTMENT PLEASE CALL HIM.           "

## 2023-05-22 NOTE — TELEPHONE ENCOUNTER
Yes he does need an appointment.  However, I did go ahead and order his CMP and lipid panel that he can collect a couple of days prior to his appointment if he would like to have the results available to discuss when he comes

## 2023-05-30 ENCOUNTER — LAB (OUTPATIENT)
Dept: INTERNAL MEDICINE | Facility: CLINIC | Age: 43
End: 2023-05-30

## 2023-05-30 DIAGNOSIS — E78.2 MIXED HYPERLIPIDEMIA: ICD-10-CM

## 2023-05-30 LAB
ALBUMIN SERPL-MCNC: 4.5 G/DL (ref 3.5–5.2)
ALBUMIN/GLOB SERPL: 1.8 G/DL
ALP SERPL-CCNC: 90 U/L (ref 39–117)
ALT SERPL W P-5'-P-CCNC: 14 U/L (ref 1–41)
ANION GAP SERPL CALCULATED.3IONS-SCNC: 14.6 MMOL/L (ref 5–15)
AST SERPL-CCNC: 15 U/L (ref 1–40)
BILIRUB SERPL-MCNC: 0.4 MG/DL (ref 0–1.2)
BUN SERPL-MCNC: 10 MG/DL (ref 6–20)
BUN/CREAT SERPL: 11 (ref 7–25)
CALCIUM SPEC-SCNC: 9.8 MG/DL (ref 8.6–10.5)
CHLORIDE SERPL-SCNC: 104 MMOL/L (ref 98–107)
CHOLEST SERPL-MCNC: 117 MG/DL (ref 0–200)
CO2 SERPL-SCNC: 24.4 MMOL/L (ref 22–29)
CREAT SERPL-MCNC: 0.91 MG/DL (ref 0.76–1.27)
EGFRCR SERPLBLD CKD-EPI 2021: 109.3 ML/MIN/1.73
GLOBULIN UR ELPH-MCNC: 2.5 GM/DL
GLUCOSE SERPL-MCNC: 87 MG/DL (ref 65–99)
HDLC SERPL-MCNC: 43 MG/DL (ref 40–60)
LDLC SERPL CALC-MCNC: 61 MG/DL (ref 0–100)
LDLC/HDLC SERPL: 1.44 {RATIO}
POTASSIUM SERPL-SCNC: 4.2 MMOL/L (ref 3.5–5.2)
PROT SERPL-MCNC: 7 G/DL (ref 6–8.5)
SODIUM SERPL-SCNC: 143 MMOL/L (ref 136–145)
TRIGL SERPL-MCNC: 61 MG/DL (ref 0–150)
VLDLC SERPL-MCNC: 13 MG/DL (ref 5–40)

## 2023-05-30 PROCEDURE — 36415 COLL VENOUS BLD VENIPUNCTURE: CPT | Performed by: NURSE PRACTITIONER

## 2023-05-30 PROCEDURE — 80053 COMPREHEN METABOLIC PANEL: CPT | Performed by: NURSE PRACTITIONER

## 2023-05-30 PROCEDURE — 80061 LIPID PANEL: CPT | Performed by: NURSE PRACTITIONER

## 2023-06-02 ENCOUNTER — TELEMEDICINE (OUTPATIENT)
Dept: INTERNAL MEDICINE | Facility: CLINIC | Age: 43
End: 2023-06-02
Payer: COMMERCIAL

## 2023-06-02 VITALS — BODY MASS INDEX: 33.08 KG/M2 | WEIGHT: 215 LBS

## 2023-06-02 DIAGNOSIS — E78.2 MIXED HYPERLIPIDEMIA: ICD-10-CM

## 2023-06-02 RX ORDER — ATORVASTATIN CALCIUM 10 MG/1
10 TABLET, FILM COATED ORAL DAILY
Qty: 90 TABLET | Refills: 1 | Status: SHIPPED | OUTPATIENT
Start: 2023-06-02

## 2023-06-02 NOTE — PROGRESS NOTES
"  This was an audio and video enabled visit.   This provider is located at   the [facility] (through [facility]), [address], [place] [address] using a secure [employer] Video Visit through [employer] or [employer] (pt preference). Sherif Dawson"  is being seen remotely via telehealth  in [place]. Pt provided a secure environment for this session.  Sherif Dawson" may be asked to present for in office testing and/or evaluation if felt to be unsafe for telemedicine.    The provider identified herself /credentials as appropriate.   By proceeding with the telehealth visit, the patient consents to be seen remotely which allows for patient identifiable information to be sent to a third party as needed. The patient may refuse to be seen remotely at any time. The electronic data is encrypted and password protected, and the patient is advised of the potential risks to privacy not withstanding such measures.    You have chosen to receive care through a telehealth visit. Do you consent to use a video/audio connection for your medical care today? Yes      Subjective   Sherif Dickens is a 42 y.o. male.     Chief Complaint   Patient presents with    Hyperlipidemia       History of Present Illness     The patient is currently taking atorvastatin 10 mg once per day, and he denies having any side effects. His diet and exercise regimen has improved, but he is trying to cut out calories. He mentions that he has been trying to avoid foods that are higher in cholesterol and has been exercising about 3 to 4 days per week. He reports that he currently weighs about 215 pounds.     The following portions of the patient's history were reviewed and updated as appropriate: allergies, current medications, past family history, past medical history, past social history, past surgical history and problem list.        Review of Systems   Constitutional:  Negative for fatigue, fever and unexpected " weight loss.   Eyes:  Negative for blurred vision, double vision and visual disturbance.   Respiratory:  Negative for cough, shortness of breath and wheezing.    Cardiovascular:  Negative for chest pain, palpitations and leg swelling.   Gastrointestinal:  Negative for abdominal pain, constipation, diarrhea, nausea and vomiting.   Genitourinary:  Negative for difficulty urinating, frequency and urgency.   Musculoskeletal:  Negative for arthralgias and myalgias.   Skin:  Negative for color change and rash.   Neurological:  Negative for dizziness, weakness and headache.   Hematological:  Negative for adenopathy. Does not bruise/bleed easily.         Outpatient Medications Marked as Taking for the 6/2/23 encounter (Telemedicine) with Adilia DickensJELENA   Medication Sig Dispense Refill    atorvastatin (LIPITOR) 10 MG tablet Take 1 tablet by mouth Daily. 90 tablet 1     No Known Allergies        Objective   Physical Exam   Constitutional: He is oriented to person, place, and time. He appears well-developed and well-nourished. No distress.   HENT:   Head: Normocephalic and atraumatic.   Right Ear: External ear normal.   Left Ear: External ear normal.   Mouth/Throat: Oropharynx is clear and moist.   Eyes: Right eye exhibits no discharge. Left eye exhibits no discharge. No scleral icterus.   Neck: Neck normal appearance.  Pulmonary/Chest: Effort normal. No accessory muscle usage.  No respiratory distress.No use of oxygen by nasal cannula  Abdominal: Abdomen appears normal.   Neurological: He is alert and oriented to person, place, and time.   Skin: Skin is dry. He is not diaphoretic. No erythema. No pallor.   Psychiatric: He has a normal mood and affect. His speech is normal and behavior is normal. Judgment normal. He is attentive.       Vitals:    06/02/23 1144   Weight: 97.5 kg (215 lb)  Comment: pt reported     Body mass index is 33.08 kg/m².    Lab Results   Component Value Date    CHOL 117 05/30/2023    TRIG 61  05/30/2023    HDL 43 05/30/2023    LDL 61 05/30/2023     Lab Results   Component Value Date    GLUCOSE 87 05/30/2023    BUN 10 05/30/2023    CREATININE 0.91 05/30/2023    EGFR 109.3 05/30/2023    BCR 11.0 05/30/2023    K 4.2 05/30/2023    CO2 24.4 05/30/2023    CALCIUM 9.8 05/30/2023    ALBUMIN 4.5 05/30/2023    BILITOT 0.4 05/30/2023    AST 15 05/30/2023    ALT 14 05/30/2023         Assessment & Plan   Diagnoses and all orders for this visit:    1. Mixed hyperlipidemia  -     atorvastatin (LIPITOR) 10 MG tablet; Take 1 tablet by mouth Daily.  Dispense: 90 tablet; Refill: 1        Mixed hyperlipidemia  Stable based on last labs. Continue weight loss efforts to healthy diet and exercise and continue atorvastatin at current dose. Follow up for annual exam later in 2023.           Return in about 5 months (around 11/2/2023) for Annual.    I have reviewed the limitations of a telehealth visit (such as lack of a physical exam and unable to obtain vital signs) and advised the patient that they may need to follow up for an office visit should their symptoms or concerns persist, worsen, or change.  Patient was encouraged to keep me informed of any acute changes, lack of improvement, or any new concerning symptoms.   I discussed my findings,recommendations, and plan of care was with the patient. They verbalized understanding and agreement.    Transcribed from ambient dictation for JELENA Loera by Myrna Meade.  06/02/23   14:12 EDT    Patient or patient representative verbalized consent to the visit recording.  I have personally performed the services described in this document as transcribed by the above individual, and it is both accurate and complete.  JELENA Loera  6/5/2023  09:29 EDT

## 2023-11-03 ENCOUNTER — OFFICE VISIT (OUTPATIENT)
Dept: INTERNAL MEDICINE | Facility: CLINIC | Age: 43
End: 2023-11-03
Payer: COMMERCIAL

## 2023-11-03 VITALS
WEIGHT: 238 LBS | BODY MASS INDEX: 36.07 KG/M2 | HEART RATE: 66 BPM | HEIGHT: 68 IN | DIASTOLIC BLOOD PRESSURE: 78 MMHG | OXYGEN SATURATION: 97 % | SYSTOLIC BLOOD PRESSURE: 122 MMHG | RESPIRATION RATE: 18 BRPM

## 2023-11-03 DIAGNOSIS — Z23 NEED FOR COVID-19 VACCINE: ICD-10-CM

## 2023-11-03 DIAGNOSIS — E66.09 CLASS 2 OBESITY DUE TO EXCESS CALORIES WITHOUT SERIOUS COMORBIDITY WITH BODY MASS INDEX (BMI) OF 36.0 TO 36.9 IN ADULT: ICD-10-CM

## 2023-11-03 DIAGNOSIS — Z23 NEEDS FLU SHOT: ICD-10-CM

## 2023-11-03 DIAGNOSIS — Z23 NEED FOR TDAP VACCINATION: ICD-10-CM

## 2023-11-03 DIAGNOSIS — Z00.00 ANNUAL PHYSICAL EXAM: Primary | ICD-10-CM

## 2023-11-03 DIAGNOSIS — E78.2 MIXED HYPERLIPIDEMIA: ICD-10-CM

## 2023-11-03 RX ORDER — ATORVASTATIN CALCIUM 10 MG/1
10 TABLET, FILM COATED ORAL DAILY
Qty: 30 TABLET | Refills: 6 | Status: SHIPPED | OUTPATIENT
Start: 2023-11-03

## 2023-11-03 NOTE — PROGRESS NOTES
Patient Care Team:  Maninder Adilia JELENA VARGHESE as PCP - General (Nurse Practitioner)  Amos, Bassam Wilson MD as Consulting Physician (Gastroenterology)     Chief Complaint   Patient presents with    Annual Exam     Physical Exam   Pt is requesting TDAP and Flu        Patient is a 43 y.o. male who presents for his yearly physical exam.     HPI   Sherif Dickens is a 43-year-old male who presents today for his annual exam.    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 He adds it like being a  for the drinks.    The patient reports he would like to get the Covid vaccine if it is available in the office.    He reports he is not a smoker. He adds he drinks alcohol on occasions and has to test the drinks he has made.    He is trying to improve on his diet and trying to go more to the gym.    He reports he is still taking his atorvastatin. He only has a coffee, however it was not black.    He denies urinary issues.    Health maintenance/lifestyle:  Health Maintenance   Topic Date Due    TDAP/TD VACCINES (2 - Td or Tdap) 10/29/2018    INFLUENZA VACCINE  08/01/2023    COVID-19 Vaccine (5 - 2023-24 season) 01/23/2024 (Originally 9/1/2023)    LIPID PANEL  05/30/2024    ANNUAL PHYSICAL  11/03/2024    BMI FOLLOWUP  11/03/2024    COLORECTAL CANCER SCREENING  02/25/2027    HEPATITIS C SCREENING  Completed    Pneumococcal Vaccine 0-64  Aged Out     Immunization History   Administered Date(s) Administered    COVID-19 (PFIZER) BIVALENT 12+YRS 10/28/2022    COVID-19 (PFIZER) Purple Cap Monovalent 03/09/2021, 04/01/2021, 12/24/2021    Flublok 18+yrs 09/30/2021    Fluzone (or Fluarix & Flulaval for VFC) >6mos 10/26/2021, 10/28/2022    Influenza Quad Vaccine (Inpatient) 11/23/2016    Tdap 10/29/2008     Cancer-related family history includes Bone cancer in his brother; Colon cancer (age of onset: 45) in his brother; Lung cancer in his maternal uncle; Pancreatic cancer in his father. There is  no history of Prostate cancer or Breast cancer.   reports being sexually active and has had partner(s) who are female. He reports using the following method of birth control/protection: Other.  Social History     Tobacco Use   Smoking Status Never    Passive exposure: Never   Smokeless Tobacco Never     Social History     Substance and Sexual Activity   Alcohol Use Yes    Alcohol/week: 4.0 standard drinks of alcohol    Types: 4 Cans of beer per week       Covid vaccine:done today    Influenza: done today  Tetanus: done today   Hep C screening: done 2021 (negative)    PSA: due at 50 years old.  Colon cancer screening: due 2027.    Sunscreen use: wears it.  Seatbelt use: wears it     Diet: He tries to eat healthy  Exercise: Goes to gym 3 days a week.    PHQ-2 Depression Screening  Little interest or pleasure in doing things? 0-->not at all   Feeling down, depressed, or hopeless? 0-->not at all   PHQ-2 Total Score 0         Review of Systems   Constitutional: Negative.  Negative for fatigue, fever and unexpected weight loss.   HENT: Negative.     Eyes:  Negative for blurred vision, double vision and visual disturbance.   Respiratory: Negative.  Negative for cough, shortness of breath and wheezing.    Cardiovascular: Negative.  Negative for chest pain, palpitations and leg swelling.   Gastrointestinal: Negative.  Negative for abdominal pain, constipation, diarrhea, nausea and vomiting.   Genitourinary: Negative.  Negative for difficulty urinating, frequency and urgency.   Musculoskeletal: Negative.  Negative for arthralgias and myalgias.   Skin:  Negative for color change and rash.   Neurological: Negative.  Negative for dizziness, weakness and headache.   Hematological:  Negative for adenopathy. Does not bruise/bleed easily.   Psychiatric/Behavioral: Negative.           History  Social History     Socioeconomic History    Marital status:    Tobacco Use    Smoking status: Never     Passive exposure: Never     "Smokeless tobacco: Never   Vaping Use    Vaping Use: Never used   Substance and Sexual Activity    Alcohol use: Yes     Alcohol/week: 4.0 standard drinks of alcohol     Types: 4 Cans of beer per week    Drug use: Never    Sexual activity: Yes     Partners: Female     Birth control/protection: Other     History reviewed. No pertinent past medical history.   Past Surgical History:   Procedure Laterality Date    KNEE SURGERY      SPINAL FUSION  2010    c6-7    SPINAL FUSION  2014    c6-7      No Known Allergies   Family History   Problem Relation Age of Onset    Hypertension Mother     Pancreatic cancer Father     Bone cancer Brother     Colon cancer Brother 45    Lung cancer Maternal Uncle     Prostate cancer Neg Hx     Breast cancer Neg Hx        Current Outpatient Medications:     atorvastatin (LIPITOR) 10 MG tablet, Take 1 tablet by mouth Daily., Disp: 30 tablet, Rfl: 6    multivitamin with minerals tablet tablet, multivitamin, Disp: , Rfl:                   /78 (BP Location: Left arm, Patient Position: Sitting, Cuff Size: Adult)   Pulse 66   Resp 18   Ht 171.7 cm (67.6\")   Wt 108 kg (238 lb)   SpO2 97%   BMI 36.62 kg/m²       Physical Exam  Vitals and nursing note reviewed.   Constitutional:       General: He is not in acute distress.     Appearance: Normal appearance. He is well-developed. He is obese. He is not diaphoretic.   HENT:      Head: Normocephalic and atraumatic.      Right Ear: External ear normal.      Left Ear: External ear normal.      Nose: Nose normal.   Eyes:      General: No scleral icterus.        Right eye: No discharge.         Left eye: No discharge.      Conjunctiva/sclera: Conjunctivae normal.      Pupils: Pupils are equal, round, and reactive to light.   Neck:      Thyroid: No thyromegaly.      Vascular: No JVD (no bruits).      Trachea: No tracheal deviation.   Cardiovascular:      Rate and Rhythm: Normal rate and regular rhythm.      Heart sounds: No murmur heard.     No " friction rub. No gallop.   Pulmonary:      Effort: Pulmonary effort is normal. No respiratory distress.      Breath sounds: Normal breath sounds. No wheezing or rales.   Chest:      Chest wall: No tenderness.   Abdominal:      General: Bowel sounds are normal. There is no distension.      Palpations: Abdomen is soft. There is no mass.      Tenderness: There is no abdominal tenderness. There is no guarding or rebound.      Hernia: No hernia is present.   Genitourinary:     Comments: deferred  Musculoskeletal:         General: No tenderness or deformity. Normal range of motion.      Cervical back: Normal range of motion and neck supple.   Lymphadenopathy:      Cervical: No cervical adenopathy.   Skin:     General: Skin is warm and dry.      Coloration: Skin is not pale.      Findings: No erythema or rash.   Neurological:      Mental Status: He is alert and oriented to person, place, and time.      Motor: No abnormal muscle tone.      Deep Tendon Reflexes: Reflexes are normal and symmetric. Reflexes normal.   Psychiatric:         Behavior: Behavior normal.         Thought Content: Thought content normal.         Judgment: Judgment normal.                   Diagnoses and all orders for this visit:    1. Annual physical exam (Primary)  -     CBC (No Diff); Future  -     Comprehensive Metabolic Panel; Future  -     Lipid Panel; Future    2. Mixed hyperlipidemia  -     CBC (No Diff); Future  -     Comprehensive Metabolic Panel; Future  -     Lipid Panel; Future  -     atorvastatin (LIPITOR) 10 MG tablet; Take 1 tablet by mouth Daily.  Dispense: 30 tablet; Refill: 6    3. Class 2 obesity due to excess calories without serious comorbidity with body mass index (BMI) of 36.0 to 36.9 in adult    4. Needs flu shot  -     Fluzone (or Fluarix & Flulaval for VFC) >6 Mos ([id number])    5. Need for Tdap vaccination  -     Tdap Vaccine Greater Than or Equal To 6yo IM    6. Need for COVID-19 vaccine  -     COVID-19 F23 (Pfizer) 12yrs+  (COMIRNATY)    Annual Exam  -update vaccinations including Covid, flu, and Tdap. Encouraged healthy diet and routine physical activity.    Hyperlipidemia  - we'll continue statin, recheck lipids, encouraged healthy diet and routine physical activity, low fat, low cholesterol diet.     Labs  ordered as above- will notify of results and treat accordingly. If patient has not received results within one week via mychart or letter, they will notify my office  Immunizations and screenings:   Other preventative/screenings are up-to-date/addressed as noted in the HPI.  Counseling: The patient is advised to attempt to lose weight, continue current medications, and return for routine annual checkups  Follow up: Return in about 6 months (around 5/3/2024) for chronic condition follow up, and needs to return for labs within 1-2 weeks.  Plan of care discussed with pt. They verbalized understanding and agreement.     Electronically signed by : JELENA Loera    11/3/2023   10:54 EDT            Transcribed from ambient dictation for JELENA Loera by Sharon Cano.  11/03/23   11:36 EDT    Patient or patient representative verbalized consent to the visit recording.  I have personally performed the services described in this document as transcribed by the above individual, and it is both accurate and complete.  JELENA Loera  11/3/2023  13:04 EDT

## 2023-11-06 ENCOUNTER — LAB (OUTPATIENT)
Dept: INTERNAL MEDICINE | Facility: CLINIC | Age: 43
End: 2023-11-06
Payer: COMMERCIAL

## 2023-11-06 DIAGNOSIS — Z00.00 ANNUAL PHYSICAL EXAM: ICD-10-CM

## 2023-11-06 DIAGNOSIS — E78.2 MIXED HYPERLIPIDEMIA: ICD-10-CM

## 2023-11-06 LAB
ALBUMIN SERPL-MCNC: 4.8 G/DL (ref 3.5–5.2)
ALBUMIN/GLOB SERPL: 1.7 G/DL
ALP SERPL-CCNC: 101 U/L (ref 39–117)
ALT SERPL W P-5'-P-CCNC: 20 U/L (ref 1–41)
ANION GAP SERPL CALCULATED.3IONS-SCNC: 11.9 MMOL/L (ref 5–15)
AST SERPL-CCNC: 21 U/L (ref 1–40)
BILIRUB SERPL-MCNC: 0.7 MG/DL (ref 0–1.2)
BUN SERPL-MCNC: 10 MG/DL (ref 6–20)
BUN/CREAT SERPL: 10.3 (ref 7–25)
CALCIUM SPEC-SCNC: 9.7 MG/DL (ref 8.6–10.5)
CHLORIDE SERPL-SCNC: 101 MMOL/L (ref 98–107)
CHOLEST SERPL-MCNC: 144 MG/DL (ref 0–200)
CO2 SERPL-SCNC: 25.1 MMOL/L (ref 22–29)
CREAT SERPL-MCNC: 0.97 MG/DL (ref 0.76–1.27)
DEPRECATED RDW RBC AUTO: 40.8 FL (ref 37–54)
EGFRCR SERPLBLD CKD-EPI 2021: 100.6 ML/MIN/1.73
ERYTHROCYTE [DISTWIDTH] IN BLOOD BY AUTOMATED COUNT: 12.6 % (ref 12.3–15.4)
GLOBULIN UR ELPH-MCNC: 2.9 GM/DL
GLUCOSE SERPL-MCNC: 90 MG/DL (ref 65–99)
HCT VFR BLD AUTO: 45.6 % (ref 37.5–51)
HDLC SERPL-MCNC: 49 MG/DL (ref 40–60)
HGB BLD-MCNC: 15.8 G/DL (ref 13–17.7)
LDLC SERPL CALC-MCNC: 83 MG/DL (ref 0–100)
LDLC/HDLC SERPL: 1.7 {RATIO}
MCH RBC QN AUTO: 30.7 PG (ref 26.6–33)
MCHC RBC AUTO-ENTMCNC: 34.6 G/DL (ref 31.5–35.7)
MCV RBC AUTO: 88.7 FL (ref 79–97)
PLATELET # BLD AUTO: 279 10*3/MM3 (ref 140–450)
PMV BLD AUTO: 9.6 FL (ref 6–12)
POTASSIUM SERPL-SCNC: 3.9 MMOL/L (ref 3.5–5.2)
PROT SERPL-MCNC: 7.7 G/DL (ref 6–8.5)
RBC # BLD AUTO: 5.14 10*6/MM3 (ref 4.14–5.8)
SODIUM SERPL-SCNC: 138 MMOL/L (ref 136–145)
TRIGL SERPL-MCNC: 58 MG/DL (ref 0–150)
VLDLC SERPL-MCNC: 12 MG/DL (ref 5–40)
WBC NRBC COR # BLD: 9.13 10*3/MM3 (ref 3.4–10.8)

## 2023-11-06 PROCEDURE — 80053 COMPREHEN METABOLIC PANEL: CPT | Performed by: NURSE PRACTITIONER

## 2023-11-06 PROCEDURE — 85027 COMPLETE CBC AUTOMATED: CPT | Performed by: NURSE PRACTITIONER

## 2023-11-06 PROCEDURE — 36415 COLL VENOUS BLD VENIPUNCTURE: CPT | Performed by: NURSE PRACTITIONER

## 2023-11-06 PROCEDURE — 80061 LIPID PANEL: CPT | Performed by: NURSE PRACTITIONER

## 2024-07-05 DIAGNOSIS — E78.2 MIXED HYPERLIPIDEMIA: ICD-10-CM

## 2024-07-05 NOTE — TELEPHONE ENCOUNTER
Called and lvm for patient to return call, office number given.     RELAY:    Please schedule him for a follow-up appointment with JELENA Patel.

## 2024-07-12 RX ORDER — ATORVASTATIN CALCIUM 10 MG/1
10 TABLET, FILM COATED ORAL DAILY
Qty: 30 TABLET | Refills: 6 | Status: SHIPPED | OUTPATIENT
Start: 2024-07-12

## 2024-07-12 NOTE — TELEPHONE ENCOUNTER
Called again and LVM     RELAY:     Please schedule him for a follow-up appointment with JELENA Patel.

## 2024-07-12 NOTE — TELEPHONE ENCOUNTER
Rx Refill Note  Requested Prescriptions     Pending Prescriptions Disp Refills    atorvastatin (LIPITOR) 10 MG tablet [Pharmacy Med Name: ATORVASTATIN 10MG TABLETS] 30 tablet 6     Sig: TAKE 1 TABLET BY MOUTH DAILY      Last office visit with prescribing clinician: 11/3/2023   Last telemedicine visit with prescribing clinician: Visit date not found   Next office visit with prescribing clinician: 7/31/2024       Amira Scales MA  07/12/24, 15:53 EDT

## 2024-08-19 ENCOUNTER — LAB (OUTPATIENT)
Dept: INTERNAL MEDICINE | Facility: CLINIC | Age: 44
End: 2024-08-19
Payer: COMMERCIAL

## 2024-08-19 ENCOUNTER — OFFICE VISIT (OUTPATIENT)
Dept: INTERNAL MEDICINE | Facility: CLINIC | Age: 44
End: 2024-08-19
Payer: COMMERCIAL

## 2024-08-19 VITALS
TEMPERATURE: 97.8 F | BODY MASS INDEX: 36.74 KG/M2 | DIASTOLIC BLOOD PRESSURE: 82 MMHG | SYSTOLIC BLOOD PRESSURE: 124 MMHG | HEART RATE: 79 BPM | OXYGEN SATURATION: 97 % | WEIGHT: 242.4 LBS | HEIGHT: 68 IN | RESPIRATION RATE: 16 BRPM

## 2024-08-19 DIAGNOSIS — E66.09 CLASS 2 OBESITY DUE TO EXCESS CALORIES WITHOUT SERIOUS COMORBIDITY WITH BODY MASS INDEX (BMI) OF 37.0 TO 37.9 IN ADULT: ICD-10-CM

## 2024-08-19 DIAGNOSIS — F41.9 ANXIETY: ICD-10-CM

## 2024-08-19 DIAGNOSIS — E78.2 MIXED HYPERLIPIDEMIA: ICD-10-CM

## 2024-08-19 DIAGNOSIS — E78.2 MIXED HYPERLIPIDEMIA: Primary | ICD-10-CM

## 2024-08-19 LAB
ALBUMIN SERPL-MCNC: 4.6 G/DL (ref 3.5–5.2)
ALBUMIN/GLOB SERPL: 1.9 G/DL
ALP SERPL-CCNC: 105 U/L (ref 39–117)
ALT SERPL W P-5'-P-CCNC: 17 U/L (ref 1–41)
ANION GAP SERPL CALCULATED.3IONS-SCNC: 10 MMOL/L (ref 5–15)
AST SERPL-CCNC: 19 U/L (ref 1–40)
BILIRUB SERPL-MCNC: 0.4 MG/DL (ref 0–1.2)
BUN SERPL-MCNC: 11 MG/DL (ref 6–20)
BUN/CREAT SERPL: 11.1 (ref 7–25)
CALCIUM SPEC-SCNC: 9.4 MG/DL (ref 8.6–10.5)
CHLORIDE SERPL-SCNC: 106 MMOL/L (ref 98–107)
CHOLEST SERPL-MCNC: 152 MG/DL (ref 0–200)
CO2 SERPL-SCNC: 25 MMOL/L (ref 22–29)
CREAT SERPL-MCNC: 0.99 MG/DL (ref 0.76–1.27)
EGFRCR SERPLBLD CKD-EPI 2021: 98.1 ML/MIN/1.73
GLOBULIN UR ELPH-MCNC: 2.4 GM/DL
GLUCOSE SERPL-MCNC: 93 MG/DL (ref 65–99)
HDLC SERPL-MCNC: 49 MG/DL (ref 40–60)
LDLC SERPL CALC-MCNC: 91 MG/DL (ref 0–100)
LDLC/HDLC SERPL: 1.87 {RATIO}
POTASSIUM SERPL-SCNC: 4.2 MMOL/L (ref 3.5–5.2)
PROT SERPL-MCNC: 7 G/DL (ref 6–8.5)
SODIUM SERPL-SCNC: 141 MMOL/L (ref 136–145)
TRIGL SERPL-MCNC: 56 MG/DL (ref 0–150)
VLDLC SERPL-MCNC: 12 MG/DL (ref 5–40)

## 2024-08-19 PROCEDURE — 80053 COMPREHEN METABOLIC PANEL: CPT | Performed by: NURSE PRACTITIONER

## 2024-08-19 PROCEDURE — 80061 LIPID PANEL: CPT | Performed by: NURSE PRACTITIONER

## 2024-08-19 PROCEDURE — 99214 OFFICE O/P EST MOD 30 MIN: CPT | Performed by: NURSE PRACTITIONER

## 2024-08-19 PROCEDURE — 36415 COLL VENOUS BLD VENIPUNCTURE: CPT | Performed by: NURSE PRACTITIONER

## 2024-08-19 RX ORDER — ATORVASTATIN CALCIUM 10 MG/1
10 TABLET, FILM COATED ORAL DAILY
Qty: 90 TABLET | Refills: 2 | Status: SHIPPED | OUTPATIENT
Start: 2024-08-19

## 2024-08-19 NOTE — PROGRESS NOTES
Sherif SORAYA Maninder presents to [facility] for     Chief Complaint  Chief Complaint   Patient presents with    Follow-up     Patient states everything is going good, no questions or concerns.     Hyperlipidemia       PCP:  Adilia Dickens APRN    Subjective        History of Present Illness    Yonatan is a 43-year-old male who is here to follow-up on hyperlipidemia and obesity.  He reports overall he has been doing fairly well.   Weight increasing a bit.  He feels like this may be related to anxiety and stress and coping through eating.  He has been seeing a therapist for about the last year.     Has been more active than normal. Has been doing weight lifting classes. And [employer] cardio class.  4 times a week.   Eats healthy meals but unhealthy junk food/snacks in between when at work particularly when stressed.   Weight has increased 4 pounds in the last 9 months.  He is frustrated because he used to be able to lose weight a lot easier.  He feels like he needs something as an appetite suppressant.  Has never been on weight loss medications      Health Maintenance:   Health Maintenance   Topic Date Due    INFLUENZA VACCINE  08/01/2024    ANNUAL PHYSICAL  11/03/2024    BMI FOLLOWUP  11/03/2024    LIPID PANEL  11/06/2024    COLORECTAL CANCER SCREENING  02/25/2027    TDAP/TD VACCINES (3 - Td or Tdap) 11/03/2033    HEPATITIS C SCREENING  Completed    COVID-19 Vaccine  Completed    Pneumococcal Vaccine 0-64  Aged Out         Review of Systems   Constitutional:  Positive for unexpected weight gain. Negative for fatigue, fever and unexpected weight loss.   Eyes:  Negative for blurred vision, double vision and visual disturbance.   Respiratory:  Negative for cough, shortness of breath and wheezing.    Cardiovascular:  Negative for chest pain, palpitations and leg swelling.   Gastrointestinal:  Negative for abdominal pain, constipation, diarrhea, nausea and vomiting.   Genitourinary:  Negative for  difficulty urinating, frequency and urgency.   Musculoskeletal:  Negative for arthralgias and myalgias.   Skin:  Negative for color change and rash.   Neurological:  Negative for dizziness, weakness and headache.   Hematological:  Negative for adenopathy. Does not bruise/bleed easily.   Psychiatric/Behavioral:  Positive for stress. Negative for self-injury and suicidal ideas. The patient is nervous/anxious.          No Known Allergies  Current Outpatient Medications on File Prior to Visit   Medication Sig Dispense Refill    multivitamin with minerals tablet tablet multivitamin      [DISCONTINUED] atorvastatin (LIPITOR) 10 MG tablet TAKE 1 TABLET BY MOUTH DAILY 30 tablet 6     No current facility-administered medications on file prior to visit.         The following portions of the patient's history were reviewed and updated as appropriate: allergies, current medications, past family history, past medical history, past social history, past surgical history and problem list and are available for review within electronic record    Objective     Result Review :     The following data was reviewed by: JELENA Loera on 08/19/2024:  CMP          11/6/2023    13:48   CMP   Glucose 90    BUN 10    Creatinine 0.97    EGFR 100.6    Sodium 138    Potassium 3.9    Chloride 101    Calcium 9.7    Total Protein 7.7    Albumin 4.8    Globulin 2.9    Total Bilirubin 0.7    Alkaline Phosphatase 101    AST (SGOT) 21    ALT (SGPT) 20    Albumin/Globulin Ratio 1.7    BUN/Creatinine Ratio 10.3    Anion Gap 11.9      CBC          11/6/2023    13:48   CBC   WBC 9.13    RBC 5.14    Hemoglobin 15.8    Hematocrit 45.6    MCV 88.7    MCH 30.7    MCHC 34.6    RDW 12.6    Platelets 279      Lipid Panel          11/6/2023    13:48   Lipid Panel   Total Cholesterol 144    Triglycerides 58    HDL Cholesterol 49    VLDL Cholesterol 12    LDL Cholesterol  83    LDL/HDL Ratio 1.70                   Vital Signs:   /82 (BP Location: Right  "arm, Patient Position: Sitting, Cuff Size: Adult)   Pulse 79   Temp 97.8 °F (36.6 °C) (Temporal)   Resp 16   Ht 171.7 cm (67.6\")   Wt 110 kg (242 lb 6.4 oz)   SpO2 97%   BMI 37.30 kg/m²         Physical Exam  Vitals and nursing note reviewed.   Constitutional:       Appearance: Normal appearance. He is well-developed. He is obese.   HENT:      Head: Normocephalic and atraumatic.   Eyes:      Conjunctiva/sclera: Conjunctivae normal.   Cardiovascular:      Rate and Rhythm: Normal rate and regular rhythm.      Heart sounds: Normal heart sounds.   Pulmonary:      Effort: Pulmonary effort is normal. No respiratory distress.      Breath sounds: Normal breath sounds.   Abdominal:      General: Bowel sounds are normal. There is no distension.      Palpations: Abdomen is soft.      Tenderness: There is no abdominal tenderness.   Musculoskeletal:      Cervical back: Normal range of motion.   Skin:     General: Skin is warm and dry.   Neurological:      Mental Status: He is alert and oriented to person, place, and time.   Psychiatric:         Speech: Speech normal.         Behavior: Behavior normal.         Thought Content: Thought content normal.         Judgment: Judgment normal.                 Assessment and Plan      Diagnoses and all orders for this visit:    1. Mixed hyperlipidemia (Primary)  -     atorvastatin (LIPITOR) 10 MG tablet; Take 1 tablet by mouth Daily.  Dispense: 90 tablet; Refill: 2  -     Comprehensive Metabolic Panel; Future  -     Lipid Panel; Future  -     naltrexone-bupropion ER (CONTRAVE) 8-90 MG tablet; Wk 1: 1 tab daily, Wk 2: 1 tab twice a day, Wk 3: 2 tabs in AM, 1 tab in PM, Wk 4: 2 tabs twice a day, Maintenance dose: 2 tabs twice daily.  Dispense: 120 tablet; Refill: 0  Recheck lipids.  Low-fat low-cholesterol diet.  Continue statin.  Will also go ahead and see if we can get him started on some Contrave to help with appetite suppression and weight reduction which should impact lipids " positively  2. Class 2 obesity due to excess calories without serious comorbidity with body mass index (BMI) of 37.0 to 37.9 in adult  -     naltrexone-bupropion ER (CONTRAVE) 8-90 MG tablet; Wk 1: 1 tab daily, Wk 2: 1 tab twice a day, Wk 3: 2 tabs in AM, 1 tab in PM, Wk 4: 2 tabs twice a day, Maintenance dose: 2 tabs twice daily.  Dispense: 120 tablet; Refill: 0  Patient's (Body mass index is 37.3 kg/m².) indicates that they are obese (BMI >30) with health related conditions that include dyslipidemias . Weight is worsening. BMI is is above average; BMI management plan is completed. We discussed low calorie, low carb based diet program, portion control, increasing exercise, and pharmacologic options including contrave .   3. Anxiety  Continue to work with his counselor.  We did discuss the possibility of medications today but he defers for now      Follow Up     Patient was given instructions and counseling regarding his condition or for health maintenance advice. Please see specific information pulled into the AVS if appropriate.   Any new medication's adverse effects have been discussed in detail with patient  Patient was encouraged to keep me informed of any acute changes, lack of improvement, or any new concerning symptoms.    Return in about 3 weeks (around 9/9/2024) for Recheck obesity, and need to collect labs today, also annual in 6 m.          Dictated Utilizing Dragon Dictation   Please note that portions of this note were completed with a voice recognition program.   Part of this note may be an electronic transcription/translation of spoken language to printed text using the Dragon Dictation System.

## 2024-08-26 ENCOUNTER — TELEPHONE (OUTPATIENT)
Dept: INTERNAL MEDICINE | Facility: CLINIC | Age: 44
End: 2024-08-26
Payer: COMMERCIAL

## 2024-08-26 NOTE — TELEPHONE ENCOUNTER
Spoke to patient and relayed message regarding labs. Patient showed full understanding and had no further questions.

## 2024-08-26 NOTE — TELEPHONE ENCOUNTER
Called Pt and left voicemail to return call.                Your labs are in acceptable ranges/are stable. Please continue your current treatment plan and/or medications.   Written by JELENA Ellison on 8/21/2024  3:02 PM EDT

## 2025-03-31 ENCOUNTER — LAB (OUTPATIENT)
Dept: INTERNAL MEDICINE | Facility: CLINIC | Age: 45
End: 2025-03-31
Payer: COMMERCIAL

## 2025-03-31 ENCOUNTER — OFFICE VISIT (OUTPATIENT)
Dept: INTERNAL MEDICINE | Facility: CLINIC | Age: 45
End: 2025-03-31
Payer: COMMERCIAL

## 2025-03-31 VITALS
HEART RATE: 64 BPM | BODY MASS INDEX: 36.01 KG/M2 | SYSTOLIC BLOOD PRESSURE: 116 MMHG | HEIGHT: 68 IN | WEIGHT: 237.6 LBS | TEMPERATURE: 97.3 F | RESPIRATION RATE: 16 BRPM | DIASTOLIC BLOOD PRESSURE: 80 MMHG | OXYGEN SATURATION: 99 %

## 2025-03-31 DIAGNOSIS — Z00.00 ANNUAL PHYSICAL EXAM: ICD-10-CM

## 2025-03-31 DIAGNOSIS — E66.812 CLASS 2 OBESITY DUE TO EXCESS CALORIES WITHOUT SERIOUS COMORBIDITY WITH BODY MASS INDEX (BMI) OF 36.0 TO 36.9 IN ADULT: ICD-10-CM

## 2025-03-31 DIAGNOSIS — Z00.00 ANNUAL PHYSICAL EXAM: Primary | ICD-10-CM

## 2025-03-31 DIAGNOSIS — E78.2 MIXED HYPERLIPIDEMIA: ICD-10-CM

## 2025-03-31 DIAGNOSIS — Z23 FLU VACCINE NEED: ICD-10-CM

## 2025-03-31 DIAGNOSIS — E66.09 CLASS 2 OBESITY DUE TO EXCESS CALORIES WITHOUT SERIOUS COMORBIDITY WITH BODY MASS INDEX (BMI) OF 36.0 TO 36.9 IN ADULT: ICD-10-CM

## 2025-03-31 DIAGNOSIS — Z23 NEED FOR COVID-19 VACCINE: ICD-10-CM

## 2025-03-31 LAB
25(OH)D3 SERPL-MCNC: 21.2 NG/ML (ref 30–100)
ALBUMIN SERPL-MCNC: 4.5 G/DL (ref 3.5–5.2)
ALBUMIN/GLOB SERPL: 1.6 G/DL
ALP SERPL-CCNC: 93 U/L (ref 39–117)
ALT SERPL W P-5'-P-CCNC: 17 U/L (ref 1–41)
AMPHET+METHAMPHET UR QL: NEGATIVE
AMPHETAMINE INTERNAL CONTROL: NORMAL
AMPHETAMINES UR QL: NEGATIVE
ANION GAP SERPL CALCULATED.3IONS-SCNC: 9.3 MMOL/L (ref 5–15)
AST SERPL-CCNC: 22 U/L (ref 1–40)
BARBITURATE INTERNAL CONTROL: NORMAL
BARBITURATES UR QL SCN: NEGATIVE
BENZODIAZ UR QL SCN: NEGATIVE
BENZODIAZEPINE INTERNAL CONTROL: NORMAL
BILIRUB SERPL-MCNC: 0.5 MG/DL (ref 0–1.2)
BUN SERPL-MCNC: 14 MG/DL (ref 6–20)
BUN/CREAT SERPL: 14.9 (ref 7–25)
BUPRENORPHINE INTERNAL CONTROL: NORMAL
BUPRENORPHINE SERPL-MCNC: NEGATIVE NG/ML
CALCIUM SPEC-SCNC: 9.1 MG/DL (ref 8.6–10.5)
CANNABINOIDS SERPL QL: NEGATIVE
CHLORIDE SERPL-SCNC: 107 MMOL/L (ref 98–107)
CHOLEST SERPL-MCNC: 111 MG/DL (ref 0–200)
CO2 SERPL-SCNC: 25.7 MMOL/L (ref 22–29)
COCAINE INTERNAL CONTROL: NORMAL
COCAINE UR QL: NEGATIVE
CREAT SERPL-MCNC: 0.94 MG/DL (ref 0.76–1.27)
DEPRECATED RDW RBC AUTO: 39.8 FL (ref 37–54)
EGFRCR SERPLBLD CKD-EPI 2021: 103.8 ML/MIN/1.73
ERYTHROCYTE [DISTWIDTH] IN BLOOD BY AUTOMATED COUNT: 12.2 % (ref 12.3–15.4)
EXPIRATION DATE: NORMAL
GLOBULIN UR ELPH-MCNC: 2.8 GM/DL
GLUCOSE SERPL-MCNC: 92 MG/DL (ref 65–99)
HBA1C MFR BLD: 5.3 % (ref 4.8–5.6)
HCT VFR BLD AUTO: 45.3 % (ref 37.5–51)
HDLC SERPL-MCNC: 42 MG/DL (ref 40–60)
HGB BLD-MCNC: 15.4 G/DL (ref 13–17.7)
LDLC SERPL CALC-MCNC: 59 MG/DL (ref 0–100)
LDLC/HDLC SERPL: 1.47 {RATIO}
Lab: NORMAL
MCH RBC QN AUTO: 30.4 PG (ref 26.6–33)
MCHC RBC AUTO-ENTMCNC: 34 G/DL (ref 31.5–35.7)
MCV RBC AUTO: 89.3 FL (ref 79–97)
MDMA (ECSTASY) INTERNAL CONTROL: NORMAL
MDMA UR QL SCN: NEGATIVE
METHADONE INTERNAL CONTROL: NORMAL
METHADONE UR QL SCN: NEGATIVE
METHAMPHETAMINE INTERNAL CONTROL: NORMAL
MORPHINE INTERNAL CONTROL: NORMAL
MORPHINE/OPIATES SCREEN, URINE: NEGATIVE
OXYCODONE INTERNAL CONTROL: NORMAL
OXYCODONE UR QL SCN: NEGATIVE
PCP UR QL SCN: NEGATIVE
PHENCYCLIDINE INTERNAL CONTROL: NORMAL
PLATELET # BLD AUTO: 297 10*3/MM3 (ref 140–450)
PMV BLD AUTO: 9.7 FL (ref 6–12)
POTASSIUM SERPL-SCNC: 4.3 MMOL/L (ref 3.5–5.2)
PROPOXYPH UR QL SCN: NEGATIVE
PROPOXYPHENE INTERNAL CONTROL: NORMAL
PROT SERPL-MCNC: 7.3 G/DL (ref 6–8.5)
RBC # BLD AUTO: 5.07 10*6/MM3 (ref 4.14–5.8)
SODIUM SERPL-SCNC: 142 MMOL/L (ref 136–145)
THC INTERNAL CONTROL: NORMAL
TRICYCLIC ANTIDEPRESSANTS INTERNAL CONTROL: NORMAL
TRICYCLICS UR QL SCN: NEGATIVE
TRIGL SERPL-MCNC: 36 MG/DL (ref 0–150)
VIT B12 BLD-MCNC: 410 PG/ML (ref 211–946)
VLDLC SERPL-MCNC: 10 MG/DL (ref 5–40)
WBC NRBC COR # BLD AUTO: 6.38 10*3/MM3 (ref 3.4–10.8)

## 2025-03-31 PROCEDURE — 82607 VITAMIN B-12: CPT | Performed by: NURSE PRACTITIONER

## 2025-03-31 PROCEDURE — 83036 HEMOGLOBIN GLYCOSYLATED A1C: CPT | Performed by: NURSE PRACTITIONER

## 2025-03-31 PROCEDURE — 80053 COMPREHEN METABOLIC PANEL: CPT | Performed by: NURSE PRACTITIONER

## 2025-03-31 PROCEDURE — 80061 LIPID PANEL: CPT | Performed by: NURSE PRACTITIONER

## 2025-03-31 PROCEDURE — 82306 VITAMIN D 25 HYDROXY: CPT | Performed by: NURSE PRACTITIONER

## 2025-03-31 PROCEDURE — 36415 COLL VENOUS BLD VENIPUNCTURE: CPT | Performed by: NURSE PRACTITIONER

## 2025-03-31 PROCEDURE — 85027 COMPLETE CBC AUTOMATED: CPT | Performed by: NURSE PRACTITIONER

## 2025-03-31 RX ORDER — PHENTERMINE HYDROCHLORIDE 37.5 MG/1
37.5 CAPSULE ORAL EVERY MORNING
Qty: 30 CAPSULE | Refills: 0 | Status: CANCELLED | OUTPATIENT
Start: 2025-03-31

## 2025-03-31 RX ORDER — PHENTERMINE HYDROCHLORIDE 15 MG/1
15 CAPSULE ORAL EVERY MORNING
Qty: 30 CAPSULE | Refills: 0 | Status: SHIPPED | OUTPATIENT
Start: 2025-03-31

## 2025-03-31 NOTE — PROGRESS NOTES
Patient Care Team:  Adilia Dickens APRN as PCP - General (Nurse Practitioner)  Bassam Trinidad MD as Consulting Physician (Gastroenterology)     Chief Complaint   Patient presents with    Annual Exam           Patient is a 44 y.o. male who presents for his yearly physical exam.     HPI        Carpal tunnel  worse this winter with shoveling snow and ice. Ok now.  Getting ready to do some home improvement and worried it will flare back up.  Uses splints and do not help as much as they used to . Ok 90% of the time.     Hyperlipidemia  on atorvastatin. Tolerating well.     Colonoscopy due in 2027    Exercise: Treadmill 30 min a day   Diet: cutting out sugar   Wife has been using phentermine and losing weight. He is interested in trying this too. He took a couple of her doses and felt good on it and decreased his appetite.     Review of Systems   Constitutional:  Positive for activity change and appetite change. Negative for chills, fatigue, fever, unexpected weight gain and unexpected weight loss.   Eyes:  Negative for blurred vision, double vision and visual disturbance.   Respiratory:  Negative for cough, shortness of breath and wheezing.    Cardiovascular:  Negative for chest pain, palpitations and leg swelling.   Gastrointestinal:  Negative for abdominal pain, constipation, diarrhea, nausea and vomiting.   Genitourinary:  Negative for difficulty urinating, frequency and urgency.   Musculoskeletal:  Negative for arthralgias and myalgias.   Skin:  Negative for color change and rash.   Neurological:  Negative for dizziness, weakness and headache.   Hematological:  Negative for adenopathy. Does not bruise/bleed easily.   All other systems reviewed and are negative.         Health maintenance/lifestyle:  Health Maintenance   Topic Date Due    INFLUENZA VACCINE  07/01/2024    COVID-19 Vaccine (6 - 2024-25 season) 09/01/2024    ANNUAL PHYSICAL  11/03/2024    LIPID PANEL  08/19/2025    COLORECTAL CANCER  SCREENING  02/25/2027    TDAP/TD VACCINES (3 - Td or Tdap) 11/03/2033    HEPATITIS C SCREENING  Completed    Pneumococcal Vaccine 0-49  Aged Out     Immunization History   Administered Date(s) Administered    COVID-19 (PFIZER) 12YRS+ (COMIRNATY) 11/03/2023    COVID-19 (PFIZER) BIVALENT 12+YRS 10/28/2022    COVID-19 (PFIZER) Purple Cap Monovalent 03/09/2021, 04/01/2021, 12/24/2021    Flublok 18+yrs 09/30/2021    Fluzone  >6mos 11/23/2016    Fluzone (or Fluarix & Flulaval for VFC) >6mos 10/26/2021, 10/28/2022, 11/03/2023    Tdap 10/29/2008, 11/03/2023     Cancer-related family history includes Bone cancer in his brother and maternal grandfather; Brain cancer in his maternal uncle; Colon cancer in his maternal uncle; Colon cancer (age of onset: 45) in his brother; Lung cancer in his maternal uncle. There is no history of Prostate cancer or Breast cancer.   reports being sexually active and has had partner(s) who are female. He reports using the following methods of birth control/protection: Vasectomy and Hysterectomy.  Social History     Tobacco Use   Smoking Status Never    Passive exposure: Never   Smokeless Tobacco Never     Social History     Substance and Sexual Activity   Alcohol Use Yes    Alcohol/week: 2.0 standard drinks of alcohol    Types: 2 Cans of beer per week       PHQ-2 Depression Screening  Little interest or pleasure in doing things? Not at all   Feeling down, depressed, or hopeless? Not at all   PHQ-2 Total Score 0          History  Social History     Socioeconomic History    Marital status:    Tobacco Use    Smoking status: Never     Passive exposure: Never    Smokeless tobacco: Never   Vaping Use    Vaping status: Never Used   Substance and Sexual Activity    Alcohol use: Yes     Alcohol/week: 2.0 standard drinks of alcohol     Types: 2 Cans of beer per week    Drug use: Never    Sexual activity: Yes     Partners: Female     Birth control/protection: Vasectomy, Hysterectomy     Past Medical  "History:   Diagnosis Date    Anxiety 2018    Hyperlipidemia 2020    Obesity 2012      Past Surgical History:   Procedure Laterality Date    COLONOSCOPY  03/21    KNEE SURGERY      SPINAL FUSION  2010    c6-7    SPINAL FUSION  2014    c6-7    VASECTOMY  2020      No Known Allergies   Family History   Problem Relation Age of Onset    Hypertension Mother     Other (pancreatic cancer) Father     Bone cancer Brother     Colon cancer Brother 45    Bone cancer Maternal Grandfather     Heart attack Maternal Grandfather 65    Lung cancer Maternal Uncle     Brain cancer Maternal Uncle     Colon cancer Maternal Uncle     Prostate cancer Neg Hx     Breast cancer Neg Hx        Current Outpatient Medications:     atorvastatin (LIPITOR) 10 MG tablet, Take 1 tablet by mouth Daily., Disp: 90 tablet, Rfl: 2    multivitamin with minerals tablet tablet, multivitamin, Disp: , Rfl:     phentermine 15 MG capsule, Take 1 capsule by mouth Every Morning., Disp: 30 capsule, Rfl: 0              /80 (BP Location: Right arm, Patient Position: Sitting, Cuff Size: Adult)   Pulse 64   Temp 97.3 °F (36.3 °C) (Infrared)   Resp 16   Ht 171.7 cm (67.6\")   Wt 108 kg (237 lb 9.6 oz)   SpO2 99%   BMI 36.56 kg/m²       Physical Exam  Vitals and nursing note reviewed.   Constitutional:       General: He is not in acute distress.     Appearance: Normal appearance. He is well-developed. He is not diaphoretic.   HENT:      Head: Normocephalic and atraumatic.      Right Ear: External ear normal.      Left Ear: External ear normal.      Nose: Nose normal.   Eyes:      General: No scleral icterus.        Right eye: No discharge.         Left eye: No discharge.      Conjunctiva/sclera: Conjunctivae normal.      Pupils: Pupils are equal, round, and reactive to light.   Neck:      Thyroid: No thyromegaly.      Vascular: No carotid bruit or JVD.      Trachea: No tracheal deviation.   Cardiovascular:      Rate and Rhythm: Normal rate and regular rhythm. "      Heart sounds: Normal heart sounds. No murmur heard.     No friction rub. No gallop.   Pulmonary:      Effort: Pulmonary effort is normal. No respiratory distress.      Breath sounds: Normal breath sounds. No wheezing or rales.   Chest:      Chest wall: No tenderness.   Abdominal:      General: Bowel sounds are normal. There is no distension.      Palpations: Abdomen is soft. There is no mass.      Tenderness: There is no abdominal tenderness. There is no guarding or rebound.      Hernia: No hernia is present.   Genitourinary:     Comments: deferred  Musculoskeletal:         General: No tenderness or deformity. Normal range of motion.      Cervical back: Normal range of motion and neck supple.   Lymphadenopathy:      Cervical: No cervical adenopathy.   Skin:     General: Skin is warm and dry.      Coloration: Skin is not pale.      Findings: No erythema or rash.   Neurological:      Mental Status: He is alert and oriented to person, place, and time.      Motor: No abnormal muscle tone.      Deep Tendon Reflexes: Reflexes are normal and symmetric. Reflexes normal.   Psychiatric:         Speech: Speech normal.         Behavior: Behavior normal.         Thought Content: Thought content normal.         Judgment: Judgment normal.                   Diagnoses and all orders for this visit:    1. Annual physical exam (Primary)  -     CBC (No Diff); Future  -     Comprehensive Metabolic Panel; Future  -     Lipid Panel; Future  -     Hemoglobin A1c; Future  -     Vitamin B12; Future    2. Class 2 obesity due to excess calories without serious comorbidity with body mass index (BMI) of 36.0 to 36.9 in adult  -     POC Medline 14 Panel Urine Drug Screen  -     phentermine 15 MG capsule; Take 1 capsule by mouth Every Morning.  Dispense: 30 capsule; Refill: 0  -     Ambulatory Referral to Nutrition Services  -     Hemoglobin A1c; Future  -     Vitamin B12; Future  -     Vitamin D,25-Hydroxy; Future  [place] reviewed via PDMP  and appropriate.   UDS completed.   CSC/Risk controlled substances updated.     3. Mixed hyperlipidemia  -     CBC (No Diff); Future  -     Comprehensive Metabolic Panel; Future  -     Lipid Panel; Future  Low fat low cholesterol diet.   Recheck lipids.   Continue statin.     4. Need for COVID-19 vaccine  -     COVID-19 (Pfizer) 12yrs+ (COMIRNATY)    5. Flu vaccine need  -     Fluzone >6mos         Labs  ordered as above- will notify of results and treat accordingly. If patient has not received results within one week via mychart or letter, they will notify my office  Immunizations and screenings: Preventative/screenings are up-to-date/addressed as noted in the HPI.  Counseling: The patient is advised to lose weight      Follow up: Return in about 4 weeks (around 4/28/2025).  Plan of care discussed with pt. They verbalized understanding and agreement.     Electronically signed by : JELENA Loera   3/31/2025   09:56 EDT

## 2025-04-04 ENCOUNTER — HOSPITAL ENCOUNTER (OUTPATIENT)
Dept: NUTRITION | Facility: HOSPITAL | Age: 45
Setting detail: RECURRING SERIES
Discharge: HOME | End: 2025-04-04

## 2025-04-04 PROCEDURE — 97802 MEDICAL NUTRITION INDIV IN: CPT

## 2025-04-04 NOTE — CONSULTS
[facility]          Initial 60 Minute Nutrition Visit    Date: 2025   Patient Name: Sherif Dickens  : 1980   MRN: [id number]   Referring Provider: Adilia Dickens APRN    Reason for Visit: Weight loss  Visit Format: Teams     Nutrition Assessment       Social History:   Social History     Socioeconomic History    Marital status:    Tobacco Use    Smoking status: Never     Passive exposure: Never    Smokeless tobacco: Never   Vaping Use    Vaping status: Never Used   Substance and Sexual Activity    Alcohol use: Yes     Alcohol/week: 2.0 standard drinks of alcohol     Types: 2 Cans of beer per week    Drug use: Never    Sexual activity: Yes     Partners: Female     Birth control/protection: Vasectomy, Hysterectomy     Active Problem List:   Patient Active Problem List    Diagnosis     Class 2 obesity due to excess calories without serious comorbidity with body mass index (BMI) of 36.0 to 36.9 in adult [E66.812, E66.09, Z68.36]       Current Medications:   Current Outpatient Medications:     atorvastatin (LIPITOR) 10 MG tablet, Take 1 tablet by mouth Daily., Disp: 90 tablet, Rfl: 2    multivitamin with minerals tablet tablet, multivitamin, Disp: , Rfl:     phentermine 15 MG capsule, Take 1 capsule by mouth Every Morning., Disp: 30 capsule, Rfl: 0    Labs: n/a    Hunger Vital Sign Food Insecurity Assessment:  Within the past 12 months I/we worried whether our food would run out before I/we got money to buy more: no   Within the past 12 months the food I/we bought just didn't last and I/we didn't have money to get more: no   Use of food assistance programs (WIC, food stamps, food [place]) no       Food & Nutrition Related History       Food Allergies: none  Food Intolerances: none  Food Behavior: stress eating  Nutrition Impact Symptoms:  n/a  Gastrointestinal conditions that impact intake or food choices: n/a  Details at home: n/a  Who prepares most meals: spouse  "and patient   Who does grocery shopping: spouse and patient   How many meals are purchased from fast food/sit down restaurants per week:  not often   Difficulty chewin - Normal  Difficulty swallowin - Normal  Diet requirement related to personal preference or cultural belief: in general, a \"healthy\" diet    History of eating disorder/disordered eating habits: None  Language/communication details: English  Barriers to learning: No barriers identified at this time    24 Hour Recall: See Assessment       Anthropometrics      Height:   Ht Readings from Last 1 Encounters:   25 171.7 cm (67.6\")     Weight:   Wt Readings from Last 3 Encounters:   25 108 kg (237 lb 9.6 oz)   24 110 kg (242 lb 6.4 oz)   23 108 kg (238 lb)     BMI: There is no height or weight on file to calculate BMI.   Weight Change: n/a     Physical Activity       Barriers to physical activity: no barriers identified      Physical activity comments: Sedentary      Estimated Needs     Estimated Energy Needs: not discussed     Estimated Protein Needs: >100g/day      Estimated Fluid Needs: n/a     Discussion / Education      Patient is a 44 year old male referred for weight loss. Patient states that he has been struggling with his weight for a while now. He states that he has tried to lose weight in the past and states that his weight yoyo's. He states that he does struggling with stress eating. He states that he will eat fairly healthy throughout the day and then stress eat at night. He states that he does try to prepare most meals at home. Rarely eats out. He primarily drinks water, coffee, and tea. Has cut out sugary drinks already. He states that he does not incorporate any physical activity into his routine.    Education provided today primarily focused on weight management. Educated patient on the three macronutrients and what each do for our body. Discussed the difference between saturated and unsaturated fat. Discussed " how to build a healthy plate by using the plate method. Explained the importance of portion control and balanced meals. Discussed the importance of fiber for GI health, satiety, and lowering cholesterol levels. Discussed healthy snack options and quick meal ideas. Reviewed the nutrition label and recommended lowering sodium, saturated fat, and added sugar consumption. Discussed different mindful eating strategies to help make more conscious decisions during meal and snack times. Discussed strategies to reduce stress eating. Encouraged adding physical activity to daily routine and gave examples of how to incorporate that into routine. Recommended focusing on 2 things to start building healthy habits. Encouraged patient to reach out with any additional questions or concerns.     Assessment of patient engagement: Engaged    Measurement of understanding: Patient verbalized understanding    Resources Provided:  3 Macronutrients, weight management packet, quick meal ideas, high fiber snack ideas, tips to support weight loss (AND)     Goal (s)      Goal 1: more consistent meals each day      Goal 2: Exercise 3 days a week          Plan of Care     PES Statement:   Overweight / Obesity related to diet and lifestye as evidenced by BMI.     Follow Up Visit      Follow Up:   May 9th @ 12pm    Total of 60 minutes spent with patient on nutrition counseling. Education based on Academy of Nutrition and Dietetics guidelines. Patient was provided with RD's contact information. Thank you for this referral.

## 2025-04-28 ENCOUNTER — OFFICE VISIT (OUTPATIENT)
Dept: INTERNAL MEDICINE | Facility: CLINIC | Age: 45
End: 2025-04-28
Payer: COMMERCIAL

## 2025-04-28 VITALS
RESPIRATION RATE: 16 BRPM | HEIGHT: 68 IN | SYSTOLIC BLOOD PRESSURE: 110 MMHG | HEART RATE: 72 BPM | OXYGEN SATURATION: 97 % | WEIGHT: 234 LBS | DIASTOLIC BLOOD PRESSURE: 78 MMHG | BODY MASS INDEX: 35.46 KG/M2 | TEMPERATURE: 97.7 F

## 2025-04-28 DIAGNOSIS — E66.812 CLASS 2 OBESITY DUE TO EXCESS CALORIES WITHOUT SERIOUS COMORBIDITY WITH BODY MASS INDEX (BMI) OF 36.0 TO 36.9 IN ADULT: Primary | ICD-10-CM

## 2025-04-28 DIAGNOSIS — E66.09 CLASS 2 OBESITY DUE TO EXCESS CALORIES WITHOUT SERIOUS COMORBIDITY WITH BODY MASS INDEX (BMI) OF 36.0 TO 36.9 IN ADULT: Primary | ICD-10-CM

## 2025-04-28 RX ORDER — PHENTERMINE HYDROCHLORIDE 30 MG/1
30 CAPSULE ORAL EVERY MORNING
Qty: 30 CAPSULE | Refills: 1 | Status: SHIPPED | OUTPATIENT
Start: 2025-04-28
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
## 2025-05-09 ENCOUNTER — HOSPITAL ENCOUNTER (OUTPATIENT)
Dept: NUTRITION | Facility: HOSPITAL | Age: 45
Setting detail: RECURRING SERIES
Discharge: HOME | End: 2025-05-09

## 2025-05-09 NOTE — PROGRESS NOTES
"[facility]   Free 30 Minute Nutrition Follow Up     Date: 2025   Patient Name: Sherif Dickens  : 1980   MRN: [id number]   Referring Provider: Adilia Dickens APRN    Reason for Visit: Weight Loss  Visit Format: Telehealth  Last Appointment Date: 25    Nutrition Assessment       Updated Labs: n/a  Food Insecurity: n/a  Food Behavior: stress eating  Nutrition Impact Symptoms:  n/a  Difficulty chewin - Normal  Difficulty swallowin - Normal    Anthropometrics      Height:   Ht Readings from Last 1 Encounters:   25 171.7 cm (67.6\")     Weight:   Wt Readings from Last 3 Encounters:   25 106 kg (234 lb)   25 108 kg (237 lb 9.6 oz)   24 110 kg (242 lb 6.4 oz)     BMI: There is no height or weight on file to calculate BMI.   Weight Change: states he has lost 8 pounds since last session on      Discussion / Education      Patient was seen today for free follow up visit referred for weight loss. RD discussed outcomes of goals that were set at initial visit. Patients goal was to exercise 3 days per week. He states that he is trying to exercise around 3 days a week currently. He is mainly walking as his primary exercise. He states that he has lost around 8 pounds since the last session. He states that he is focusing on more balanced meals at home. He has reduced his stress eating habits. He states that he does try to not eat after 7pm. He states that he is eating more consistently throughout the day and not skipping meals. He stated that he did not have any questions regarding the information discussed during initial visit. Patient did not want any new information at this time. Patient did not want to schedule follow up at this time. RD encouraged patient to call to schedule follow up if needed.     Assessment of patient engagement: Engaged    Measurement of understanding: Patient verbalized understanding    Resources Provided:  No new " resources     Goal (s)      Goal Comment % Met   Eat more consistent meals   75%   Exercise 3 days per week   100%                    Plan of Care     PES Statement:   Overweight / Obesity related to diet and lifestye as evidenced by BMI.     Status: Ongoing    Follow Up Visit      Follow Up not scheduled at this time     Total of 30 minutes spent with patient on nutrition counseling. Education based on Academy of Nutrition and Dietetics guidelines. Patient was provided with RD's contact information. Thank you for this referral.

## 2025-06-03 ENCOUNTER — OFFICE VISIT (OUTPATIENT)
Dept: INTERNAL MEDICINE | Facility: CLINIC | Age: 45
End: 2025-06-03
Payer: COMMERCIAL

## 2025-06-03 VITALS
DIASTOLIC BLOOD PRESSURE: 70 MMHG | WEIGHT: 229.4 LBS | HEIGHT: 68 IN | HEART RATE: 68 BPM | OXYGEN SATURATION: 98 % | SYSTOLIC BLOOD PRESSURE: 110 MMHG | RESPIRATION RATE: 14 BRPM | TEMPERATURE: 97.1 F | BODY MASS INDEX: 34.77 KG/M2

## 2025-06-03 DIAGNOSIS — E66.812 CLASS 2 OBESITY DUE TO EXCESS CALORIES WITHOUT SERIOUS COMORBIDITY WITH BODY MASS INDEX (BMI) OF 36.0 TO 36.9 IN ADULT: ICD-10-CM

## 2025-06-03 DIAGNOSIS — E66.09 CLASS 2 OBESITY DUE TO EXCESS CALORIES WITHOUT SERIOUS COMORBIDITY WITH BODY MASS INDEX (BMI) OF 36.0 TO 36.9 IN ADULT: ICD-10-CM

## 2025-06-03 PROCEDURE — 99213 OFFICE O/P EST LOW 20 MIN: CPT | Performed by: NURSE PRACTITIONER

## 2025-06-03 RX ORDER — PHENTERMINE HYDROCHLORIDE 30 MG/1
30 CAPSULE ORAL EVERY MORNING
Qty: 30 CAPSULE | Refills: 1 | Status: SHIPPED | OUTPATIENT
Start: 2025-06-03

## 2025-06-03 NOTE — PROGRESS NOTES
Sherif Dickens presents to [facility] for     Chief Complaint  Chief Complaint   Patient presents with    Obesity       PCP:  Adilia Dickens APRN    Subjective        Weight Management  Weight:  Decreased  Weight change (lbs):  6  Weight loss treatment:  Low calorie, low carb diet, portion control, increasing exercise and prescription medications  Treatment barriers:  Adherence to exercise  Physical activity tolerance:  Improved  Energy level:  Increased  Some aerobic exercise   Biking and treadmill, walks.   Diet: higher protein,cut out snacking between meals.    Has lost about 11 lbs.   Tolerating phentermine well with no side effects.   Wants to contne for a little longer    Health Maintenance:   Health Maintenance   Topic Date Due    INFLUENZA VACCINE  07/01/2025    ANNUAL PHYSICAL  03/31/2026    LIPID PANEL  03/31/2026    COLORECTAL CANCER SCREENING  02/25/2027    TDAP/TD VACCINES (3 - Td or Tdap) 11/03/2033    HEPATITIS C SCREENING  Completed    COVID-19 Vaccine  Completed    Pneumococcal Vaccine 0-49  Aged Out             Review of Systems   Constitutional:  Positive for activity change and appetite change. Negative for fatigue, fever, unexpected weight gain and unexpected weight loss.   Eyes:  Negative for blurred vision, double vision and visual disturbance.   Respiratory:  Negative for cough, shortness of breath and wheezing.    Cardiovascular:  Negative for chest pain, palpitations and leg swelling.   Gastrointestinal:  Negative for abdominal pain, constipation, diarrhea, nausea and vomiting.   Genitourinary:  Negative for difficulty urinating, frequency and urgency.   Musculoskeletal:  Negative for arthralgias and myalgias.   Skin:  Negative for color change and rash.   Neurological:  Negative for dizziness, weakness and headache.   Hematological:  Negative for adenopathy. Does not bruise/bleed easily.         No Known Allergies  Current Outpatient Medications on  "File Prior to Visit   Medication Sig Dispense Refill    atorvastatin (LIPITOR) 10 MG tablet Take 1 tablet by mouth Daily. 90 tablet 2    Cholecalciferol (D3 PO) Take 2,000 Units by mouth Daily.      multivitamin with minerals tablet tablet multivitamin      [DISCONTINUED] phentermine 30 MG capsule Take 1 capsule by mouth Every Morning. 30 capsule 1     No current facility-administered medications on file prior to visit.         The following portions of the patient's history were reviewed and updated as appropriate: allergies, current medications, past family history, past medical history, past social history, past surgical history and problem list and are available for review within electronic record    Objective     Result Review :                    Vital Signs:   /70 (BP Location: Left arm, Patient Position: Sitting, Cuff Size: Adult)   Pulse 68   Temp 97.1 °F (36.2 °C) (Infrared)   Resp 14   Ht 171.7 cm (67.6\")   Wt 104 kg (229 lb 6.4 oz)   SpO2 98%   BMI 35.29 kg/m²         Physical Exam  Vitals and nursing note reviewed.   Constitutional:       Appearance: Normal appearance. He is well-developed. He is obese.   HENT:      Head: Normocephalic and atraumatic.   Eyes:      Conjunctiva/sclera: Conjunctivae normal.   Cardiovascular:      Rate and Rhythm: Normal rate and regular rhythm.      Heart sounds: Normal heart sounds.   Pulmonary:      Effort: Pulmonary effort is normal. No respiratory distress.      Breath sounds: Normal breath sounds.   Abdominal:      General: Bowel sounds are normal. There is no distension.      Palpations: Abdomen is soft.      Tenderness: There is no abdominal tenderness.   Musculoskeletal:      Cervical back: Normal range of motion.   Skin:     General: Skin is warm and dry.   Neurological:      Mental Status: He is alert and oriented to person, place, and time.   Psychiatric:         Speech: Speech normal.         Behavior: Behavior normal.         Thought Content: " Thought content normal.         Judgment: Judgment normal.                 Assessment and Plan      Diagnoses and all orders for this visit:    1. Class 2 obesity due to excess calories without serious comorbidity with body mass index (BMI) of 36.0 to 36.9 in adult  -     phentermine 30 MG capsule; Take 1 capsule by mouth Every Morning.  Dispense: 30 capsule; Refill: 1    Cont high protein, lower carb/fat diet. should consume lean meats, whole grains, vegetables, and fruits, while avoiding concentrated sweets, junk foods, and fast foods.  Should engage in a minimum of 150 minutes of moderate intensity exercise per week as well.  Continue phentermine. Josselyn reviewed/appropriate via PDMP.   UDS/CSC/risks controlled sub on file.       Follow Up     Patient was given instructions and counseling regarding his condition or for health maintenance advice. Please see specific information pulled into the AVS if appropriate.   Any new medication's adverse effects have been discussed in detail with patient  Patient was encouraged to keep me informed of any acute changes, lack of improvement, or any new concerning symptoms.    Return in about 6 weeks (around 7/15/2025) for Recheck weight.          Dictated Utilizing Dragon Dictation   Please note that portions of this note were completed with a voice recognition program.   Part of this note may be an electronic transcription/translation of spoken language to printed text using the Dragon Dictation System.

## 2025-07-16 ENCOUNTER — OFFICE VISIT (OUTPATIENT)
Dept: INTERNAL MEDICINE | Facility: CLINIC | Age: 45
End: 2025-07-16
Payer: COMMERCIAL

## 2025-07-16 VITALS
HEART RATE: 72 BPM | DIASTOLIC BLOOD PRESSURE: 86 MMHG | HEIGHT: 68 IN | BODY MASS INDEX: 34.62 KG/M2 | TEMPERATURE: 96.8 F | SYSTOLIC BLOOD PRESSURE: 118 MMHG | WEIGHT: 228.4 LBS | OXYGEN SATURATION: 97 % | RESPIRATION RATE: 16 BRPM

## 2025-07-16 DIAGNOSIS — E78.2 MIXED HYPERLIPIDEMIA: ICD-10-CM

## 2025-07-16 DIAGNOSIS — E66.812 CLASS 2 OBESITY DUE TO EXCESS CALORIES WITHOUT SERIOUS COMORBIDITY WITH BODY MASS INDEX (BMI) OF 36.0 TO 36.9 IN ADULT: Primary | ICD-10-CM

## 2025-07-16 DIAGNOSIS — E66.09 CLASS 2 OBESITY DUE TO EXCESS CALORIES WITHOUT SERIOUS COMORBIDITY WITH BODY MASS INDEX (BMI) OF 36.0 TO 36.9 IN ADULT: Primary | ICD-10-CM

## 2025-07-16 RX ORDER — PHENTERMINE HYDROCHLORIDE 37.5 MG/1
37.5 CAPSULE ORAL EVERY MORNING
Qty: 30 CAPSULE | Refills: 1 | Status: SHIPPED | OUTPATIENT
Start: 2025-07-16

## 2025-07-16 RX ORDER — ATORVASTATIN CALCIUM 10 MG/1
10 TABLET, FILM COATED ORAL DAILY
Qty: 90 TABLET | Refills: 2 | Status: CANCELLED | OUTPATIENT
Start: 2025-07-16

## 2025-07-16 RX ORDER — ATORVASTATIN CALCIUM 10 MG/1
10 TABLET, FILM COATED ORAL DAILY
Qty: 90 TABLET | Refills: 2 | Status: SHIPPED | OUTPATIENT
Start: 2025-07-16

## 2025-07-19 NOTE — PROGRESS NOTES
Subjective   Sherifbakari Dickens is a 44 y.o. male.     Chief Complaint   Patient presents with    Obesity     6 week follow up        PCP: Adilia Dickens APRN    Obesity  Weight Management  Weight:  Decreased  Weight loss treatment:  Low calorie, low carb diet, increasing exercise and prescription medications  Treatment barriers:  Adherence to exercise  Physical activity tolerance:  Improved  Energy level:  Unchanged       History of Present Illness  The patient is a 44-year-old male here for a follow-up on obesity. He is currently on phentermine and has lost 9 pounds since 03/2025.    He reports no significant change in his weight since the last visit, with only a pound loss. He experienced a week-long interruption in his phentermine regimen due to unavailability during renewal. During this period, he traveled to [place] for work and subsequently developed heartburn, which he initially attributed to the resumption of phentermine. He discontinued the medication for a week but later realized the heartburn was not related to phentermine. He sought medical attention for the heartburn, which resolved immediately upon starting Prilosec. He has been back on phentermine for a few weeks now and is tolerating it well.    His recent travel and vacation to [place] have made it challenging to maintain his diet and exercise routine. He has incorporated protein powder shakes into his diet, which he finds beneficial. He also consumes [employer] bars and has noticed that a high-protein diet helps curb his appetite. He consumes one cup of coffee daily.    Diet: He has incorporated protein powder shakes and Quest bars into his diet.  Coffee/Tea/Caffeine-containing Drinks: He consumes one cup of coffee daily.    Results  Labs   - LDL cholesterol: 03/31/2025, 59    Lab Results   Component Value Date    WBC 6.38 03/31/2025    HGB 15.4 03/31/2025    HCT 45.3 03/31/2025    MCV 89.3 03/31/2025     03/31/2025     Lab Results   Component  "Value Date    GLUCOSE 92 03/31/2025    BUN 14 03/31/2025    CREATININE 0.94 03/31/2025    EGFR 103.8 03/31/2025    BCR 14.9 03/31/2025    K 4.3 03/31/2025    CO2 25.7 03/31/2025    CALCIUM 9.1 03/31/2025    ALBUMIN 4.5 03/31/2025    BILITOT 0.5 03/31/2025    AST 22 03/31/2025    ALT 17 03/31/2025     Lab Results   Component Value Date    CHOL 111 03/31/2025    TRIG 36 03/31/2025    HDL 42 03/31/2025    LDL 59 03/31/2025     Lab Results   Component Value Date    TSH 1.050 10/26/2021     A1C Last 3 Results          3/31/2025    10:14   HGBA1C Last 3 Results   Hemoglobin A1C 5.30        The following portions of the patient's history were reviewed and updated as appropriate: allergies, current medications, past family history, past medical history, past social history, past surgical history and problem list.              Outpatient Medications Marked as Taking for the 7/16/25 encounter (Office Visit) with Adilia Dickens APRN   Medication Sig Dispense Refill    atorvastatin (LIPITOR) 10 MG tablet Take 1 tablet by mouth Daily. 90 tablet 2    Cholecalciferol (D3 PO) Take 2,000 Units by mouth Daily.      multivitamin with minerals tablet tablet multivitamin      [DISCONTINUED] atorvastatin (LIPITOR) 10 MG tablet Take 1 tablet by mouth Daily. 90 tablet 2    [DISCONTINUED] phentermine 30 MG capsule Take 1 capsule by mouth Every Morning. 30 capsule 1     No Known Allergies        Objective     Vitals:    07/16/25 1041   BP: 118/86   BP Location: Right arm   Patient Position: Sitting   Cuff Size: Adult   Pulse: 72   Resp: 16   Temp: 96.8 °F (36 °C)   TempSrc: Infrared   SpO2: 97%   Weight: 104 kg (228 lb 6.4 oz)   Height: 171.7 cm (67.6\")     Body mass index is 35.14 kg/m².  Wt Readings from Last 3 Encounters:   07/16/25 104 kg (228 lb 6.4 oz)   06/03/25 104 kg (229 lb 6.4 oz)   04/28/25 106 kg (234 lb)       Physical Exam  Vitals and nursing note reviewed.   Constitutional:       Appearance: Normal appearance. He is " well-developed. He is obese.   HENT:      Head: Normocephalic and atraumatic.   Eyes:      Conjunctiva/sclera: Conjunctivae normal.   Cardiovascular:      Rate and Rhythm: Normal rate and regular rhythm.      Heart sounds: Normal heart sounds.   Pulmonary:      Effort: Pulmonary effort is normal. No respiratory distress.      Breath sounds: Normal breath sounds.   Abdominal:      General: Bowel sounds are normal. There is no distension.      Palpations: Abdomen is soft.      Tenderness: There is no abdominal tenderness.   Musculoskeletal:      Cervical back: Normal range of motion.   Skin:     General: Skin is warm and dry.   Neurological:      Mental Status: He is alert and oriented to person, place, and time.   Psychiatric:         Speech: Speech normal.         Behavior: Behavior normal.         Thought Content: Thought content normal.         Judgment: Judgment normal.         Physical Exam  Cardiovascular: Blood pressure and heart rate are normal.              Assessment & Plan   Diagnoses and all orders for this visit:    1. Class 2 obesity due to excess calories without serious comorbidity with body mass index (BMI) of 36.0 to 36.9 in adult (Primary)  -     phentermine 37.5 MG capsule; Take 1 capsule by mouth Every Morning.  Dispense: 30 capsule; Refill: 1    2. Mixed hyperlipidemia  -     atorvastatin (LIPITOR) 10 MG tablet; Take 1 tablet by mouth Daily.  Dispense: 90 tablet; Refill: 2        Assessment & Plan  1. Obesity.  - He has lost 9 pounds since 03/2025.  - Currently on phentermine and tolerating it well after an initial interruption due to heartburn, managed with Prilosec.  - Blood pressure and heart rate are within normal limits today.  - Advised to engage in 150 minutes of moderate-intensity exercise per week and adhere to a low-carbohydrate, high-protein diet, aiming for a minimum of 100 g of protein daily. Discussed the use of [employer] milkshake as a protein supplement. Advised to avoid  over-the-counter decongestants and limit caffeine intake due to the stimulant nature of his medication. Prescription for phentermine 37.5 mg, to be taken once daily in the morning, will be sent to the pharmacy.    2. Hyperlipidemia.  - Recent cholesterol lab from 03/31/2025 shows LDL at 59, indicating good control.  - Atorvastatin is effective in managing cholesterol levels.  - Refill for atorvastatin will be provided.    Follow-up visit is scheduled in 6 weeks.            Return in about 6 weeks (around 8/27/2025) for Recheck.    I discussed my findings,recommendations, and plan of care was with the patient. They verbalized understanding and agreement.  Patient was encouraged to keep me informed of any acute changes, lack of improvement, or any new concerning symptoms.     Patient or patient representative verbalized consent for the use of Ambient Listening during the visit with  JELENA Loera for chart documentation. 7/19/2025  07:37 EDT